# Patient Record
(demographics unavailable — no encounter records)

---

## 2025-03-31 NOTE — REVIEW OF SYSTEMS
Tico from [facility] calling to see if you received his request for the sleep  Study that pt had done.No record of receiving it in chart. Will be sending it again today.  [phone] Tico   [Negative] : Psychiatric

## 2025-04-03 NOTE — LETTER BODY
[Attached please find my note.] : Attached please find my note. [FreeTextEntry2] : Miroslava Arana MD [address] [place] [address] Tel [phone] Fax [phone]   Dear Dr Arana   Ms Ezekiel Giang was seen in my office for a post operative check up after her endometrial cancer surgery.  Please see my consult note for her plan of care.  Thank you for allowing me to participate in the care of this patient.    Please do not hesitate to call if you have any questions.    Most Sincerely,      Gilbert Aguillon MD [facility] Director, [facility] Professor of Obstetrics and Gynecology, [facility] [employer] at [facility] Division of Gynecologic Oncology Department Obstetrics and Gynecology Tel [phone] or [phone] Fax [phone] [email or web address]  [FreeTextEntry1] : pathology report

## 2025-04-03 NOTE — PAST MEDICAL HISTORY
[Postmenopausal] : The patient is postmenopausal [Menarche Age ____] : age at menarche was [unfilled] [Menopause Age____] : age at menopause was [unfilled] [Total Preg ___] : G[unfilled] [Live Births ___] : P[unfilled]  [Living ___] : Living: [unfilled] [AB Induced ___] : elective abortions: [unfilled]  [de-identified] : 49 yo

## 2025-04-03 NOTE — PHYSICAL EXAM
[Normal] : General appearance: No acute distress, well appearing and well nourished [de-identified] : incisions c/d/i  opsites removed today  she did not remove them as instructed [Fully active, able to carry on all pre-disease performance without restriction] : Status 0 - Fully active, able to carry on all pre-disease performance without restriction

## 2025-04-03 NOTE — PAST MEDICAL HISTORY
[Postmenopausal] : The patient is postmenopausal [Menarche Age ____] : age at menarche was [unfilled] [Menopause Age____] : age at menopause was [unfilled] [Total Preg ___] : G[unfilled] [Live Births ___] : P[unfilled]  [Living ___] : Living: [unfilled] [AB Induced ___] : elective abortions: [unfilled]  [de-identified] : 49 yo

## 2025-04-03 NOTE — PHYSICAL EXAM
[Normal] : General appearance: No acute distress, well appearing and well nourished [de-identified] : incisions c/d/i  opsites removed today  she did not remove them as instructed [Fully active, able to carry on all pre-disease performance without restriction] : Status 0 - Fully active, able to carry on all pre-disease performance without restriction

## 2025-04-03 NOTE — ASSESSMENT
[FreeTextEntry1] : Laparoscopic incisions are healing well. No signs or symptoms of infection, no bleeding. Reviewed the importance of high-quality dietary protein for wound healing.  Reiterated post-operative education, including: No heavy lifting greater than 5 pounds for 6-8 weeks. Increase ambulation as tolerated; no heavy or excessive exercise for 8 weeks. Continue showers only; pat incisions dry for the next 6-8 weeks. No tub baths or swimming in pool/ocean for 6-8 weeks. No intercourse for 8 weeks  Final pathology reviewed with patient today.  We will discussed her case during the tumor board and possible RT treatment discussed today. Patient is pathology showed minimal residual tremor in the endometrium only.  The rest of the specimen including nodes omentum and the other pelvic organs were negative for disease.  This puts the patient at a stage Ia grade 3.  As stated tumor board discussion will happen next week so that we can make a recommendation for the patient.  Return to the office visit in 3  weeks for 2nd post-op visit or earlier if the patient feels there is a need.  Emotional support provided. Patient advised to call between visits with any concerns. Patient verbalized understanding of the plan and agrees. All questions were answered.         none

## 2025-04-03 NOTE — HISTORY OF PRESENT ILLNESS
Patient arrived to [address]. VSS. L forearm 22 g placed. Plan for solumedrol x3 starting today 2/22/24. Patient oriented to room and call light use.    [FreeTextEntry1] : Surgery Date: 03/24/2025  Ezekiel Giang is post-operative following a robotic-assisted total laparoscopic hysterectomy, bilateral salpingoophorectomy, cystoscopy, and bilateral sentinel lymph node mapping and biopsy   Patient reported feeling well today and no complaint of fever, chills, nausea, vomiting, diarrhea, or vaginal bleeding. She took her narcotic medication the first two days after surgery.  She has not taken any pain medication since then.  GI function and  function both are back to normal  She did not remove her outside bandates    SURGICAL PATHOLOGY Final Diagnosis (03/24/2025- [facility]) 1. Uterus, cervix, bilateral ovaries and fallopian tubes; hysterectomy and salpingo-oophorectomy: - Minute residual serous carcinoma. - No myometrial invasion seen. - No lymphovascular invasion seen. - Benign fallopian tubes, ovaries and cervix. - See synoptic summary. 2. Lymph node, left external illiac sentinel; excision: - One lymph node, negative for carcinoma on H T E slide (0/1). 3. Lymph node, right external illiac sentinel; excision: - Two lymph nodes, negative for carcinoma on H T E slide (0/2). Note: [place] lymph node protocol is ordered in parts 2 and 3, and will be reported in an addendum. 4. Omentum; excision: - Benign fibroadipose tissue.  === VISIT HISTORY 03/11/2025 Ms. Giang, 61 years old, , LMP 02/2024, referred for abnormal endometrial biopsy.  Patient was seen by Dr. Arana for post-menopausal bleeding.  MRI-Pelvis (02/21/2025-[facility]-Dr. Rodríguez): Structure in the endometrial cavity measuring up to 1.2 cm with differential including polyp. EMB (02/27/2025-Dr. Patrick) showing papillary serious carcinoma. Microcalcification is present within the stroma. Endometrial polyps.  She reports bloating and lower back pain and vaginal bleeding/spotting.    === PATHOLOGY D&C/Hysteroscopy/EMB  (02/27/2025-Dr. Arana) Papillary serious carcinoma. Microcalcification is present within the stroma. Endometrial polyps   SURGICAL PATHOLOGY Final Diagnosis (03/24/2025- [facility]) 1. Uterus, cervix, bilateral ovaries and fallopian tubes; hysterectomy and salpingo-oophorectomy: - Minute residual serous carcinoma. - No myometrial invasion seen. - No lymphovascular invasion seen. - Benign fallopian tubes, ovaries and cervix. - See synoptic summary. 2. Lymph node, left external illiac sentinel; excision: - One lymph node, negative for carcinoma on H T E slide (0/1). 3. Lymph node, right external illiac sentinel; excision: - Two lymph nodes, negative for carcinoma on H T E slide (0/2). Note: [place] lymph node protocol is ordered in parts 2 and 3, and will be reported in an addendum. 4. Omentum; excision: - Benign fibroadipose tissue.  === IMAGINE  CAT scan/urogram(11/16/2024-[facility]) Impression: No urolithiasis, solid renal lesion or collecting system filling defect to explain hematuria. No acute process in the abdomen.  MRI-Pelvis (02/21/2025-[facility]) FINDINGS: -UTERUS: The uterus is retroflexed. The uterus measures 3.5 x 4,3 5.4 cm in AP, transverse, and craniocaudal dimension. There is a 2.0 cm fibroid anteriorly from the right fundus with subserosal component and mild enhancement with v contrast. -ENDOMETRIUM: The endometrial stripe measures 0.5 cm. There is a polypoid structure within the endometrial cavity measuring up to 1.2 x 0,5 cm. -JUNCTIONAL ZONE: The junctional zone is focally thickened posteriorly. The anterior junctional zone measures 24 cm. The posterior junctional zone measures focally thickened and measuring up to 1,2 cm cm. IMPRESSION: -Structure in the endometrial cavity measuring up to 1.2 cm with differential including polyp. -Focal thickening of the posterior junctional zone compatible with adenomyosis -Right fundal intramural and subserosal fibroid with enhancement -No bladder mass or abnormal enhancement. The etiology the patient's hematuria is not identified on this exam.    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

## 2025-04-03 NOTE — ASSESSMENT
[FreeTextEntry1] : Laparoscopic incisions are healing well. No signs or symptoms of infection, no bleeding. Reviewed the importance of high-quality dietary protein for wound healing.  Reiterated post-operative education, including: No heavy lifting greater than 5 pounds for 6-8 weeks. Increase ambulation as tolerated; no heavy or excessive exercise for 8 weeks. Continue showers only; pat incisions dry for the next 6-8 weeks. No tub baths or swimming in pool/ocean for 6-8 weeks. No intercourse for 8 weeks  Final pathology reviewed with patient today.  We will discussed her case during the tumor board and possible RT treatment discussed today. Patient is pathology showed minimal residual tremor in the endometrium only.  The rest of the specimen including nodes omentum and the other pelvic organs were negative for disease.  This puts the patient at a stage Ia grade 3.  As stated tumor board discussion will happen next week so that we can make a recommendation for the patient.  Return to the office visit in 3  weeks for 2nd post-op visit or earlier if the patient feels there is a need.  Emotional support provided. Patient advised to call between visits with any concerns. Patient verbalized understanding of the plan and agrees. All questions were answered.

## 2025-04-03 NOTE — HISTORY OF PRESENT ILLNESS
[FreeTextEntry1] : Surgery Date: 03/24/2025  Ezekiel Giang is post-operative following a robotic-assisted total laparoscopic hysterectomy, bilateral salpingoophorectomy, cystoscopy, and bilateral sentinel lymph node mapping and biopsy   Patient reported feeling well today and no complaint of fever, chills, nausea, vomiting, diarrhea, or vaginal bleeding. She took her narcotic medication the first two days after surgery.  She has not taken any pain medication since then.  GI function and  function both are back to normal  She did not remove her outside bandates    SURGICAL PATHOLOGY Final Diagnosis (03/24/2025- [facility]) 1. Uterus, cervix, bilateral ovaries and fallopian tubes; hysterectomy and salpingo-oophorectomy: - Minute residual serous carcinoma. - No myometrial invasion seen. - No lymphovascular invasion seen. - Benign fallopian tubes, ovaries and cervix. - See synoptic summary. 2. Lymph node, left external illiac sentinel; excision: - One lymph node, negative for carcinoma on H T E slide (0/1). 3. Lymph node, right external illiac sentinel; excision: - Two lymph nodes, negative for carcinoma on H T E slide (0/2). Note: [place] lymph node protocol is ordered in parts 2 and 3, and will be reported in an addendum. 4. Omentum; excision: - Benign fibroadipose tissue.  === VISIT HISTORY 03/11/2025 Ms. Giang, 61 years old, , LMP 02/2024, referred for abnormal endometrial biopsy.  Patient was seen by Dr. Arana for post-menopausal bleeding.  MRI-Pelvis (02/21/2025-[facility]-Dr. Rodríguez): Structure in the endometrial cavity measuring up to 1.2 cm with differential including polyp. EMB (02/27/2025-Dr. Patrick) showing papillary serious carcinoma. Microcalcification is present within the stroma. Endometrial polyps.  She reports bloating and lower back pain and vaginal bleeding/spotting.    === PATHOLOGY D&C/Hysteroscopy/EMB  (02/27/2025-Dr. Arana) Papillary serious carcinoma. Microcalcification is present within the stroma. Endometrial polyps   SURGICAL PATHOLOGY Final Diagnosis (03/24/2025- [facility]) 1. Uterus, cervix, bilateral ovaries and fallopian tubes; hysterectomy and salpingo-oophorectomy: - Minute residual serous carcinoma. - No myometrial invasion seen. - No lymphovascular invasion seen. - Benign fallopian tubes, ovaries and cervix. - See synoptic summary. 2. Lymph node, left external illiac sentinel; excision: - One lymph node, negative for carcinoma on H T E slide (0/1). 3. Lymph node, right external illiac sentinel; excision: - Two lymph nodes, negative for carcinoma on H T E slide (0/2). Note: [place] lymph node protocol is ordered in parts 2 and 3, and will be reported in an addendum. 4. Omentum; excision: - Benign fibroadipose tissue.  === IMAGINE  CAT scan/urogram(11/16/2024-[facility]) Impression: No urolithiasis, solid renal lesion or collecting system filling defect to explain hematuria. No acute process in the abdomen.  MRI-Pelvis (02/21/2025-[facility]) FINDINGS: -UTERUS: The uterus is retroflexed. The uterus measures 3.5 x 4,3 5.4 cm in AP, transverse, and craniocaudal dimension. There is a 2.0 cm fibroid anteriorly from the right fundus with subserosal component and mild enhancement with v contrast. -ENDOMETRIUM: The endometrial stripe measures 0.5 cm. There is a polypoid structure within the endometrial cavity measuring up to 1.2 x 0,5 cm. -JUNCTIONAL ZONE: The junctional zone is focally thickened posteriorly. The anterior junctional zone measures 24 cm. The posterior junctional zone measures focally thickened and measuring up to 1,2 cm cm. IMPRESSION: -Structure in the endometrial cavity measuring up to 1.2 cm with differential including polyp. -Focal thickening of the posterior junctional zone compatible with adenomyosis -Right fundal intramural and subserosal fibroid with enhancement -No bladder mass or abnormal enhancement. The etiology the patient's hematuria is not identified on this exam.    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

## 2025-04-03 NOTE — LETTER BODY
[Attached please find my note.] : Attached please find my note. [FreeTextEntry2] : Miroslava Arana MD [address] [place] [address] Tel [phone] Fax [phone]   Dear Dr Arana   Ms Ezekiel Giang was seen in my office for a post operative check up after her endometrial cancer surgery.  Please see my consult note for her plan of care.  Thank you for allowing me to participate in the care of this patient.    Please do not hesitate to call if you have any questions.    Most Sincerely,      Gilbert Aguillon MD [facility] Director, [facility] Professor of Obstetrics and Gynecology, [facility] [employer] at [facility] Division of Gynecologic Oncology Department Obstetrics and Gynecology Tel [phone] or [phone] Fax [phone] [email or web address]  [FreeTextEntry1] : pathology report

## 2025-04-17 NOTE — PHYSICAL EXAM
[Fully active, able to carry on all pre-disease performance without restriction] : Status 0 - Fully active, able to carry on all pre-disease performance without restriction [Chaperoned Physical Exam] : A chaperone was present in the examining room during all aspects of the physical examination. [MA] : MA [Absent] : Adnexa(ae): Absent [Normal] : Recto-Vaginal Exam: Normal [de-identified] : Well healing vaginal cuff. No s/s infection or bleeding. [FreeTextEntry2] : Geeta [de-identified] : The laparoscopic incision site shows no signs or symptoms of infection. [de-identified] : vaginal cuff healing well

## 2025-04-17 NOTE — LETTER BODY
[Attached please find my note.] : Attached please find my note. [FreeTextEntry2] : Miroslava Araan MD [address] [place] [address] Tel [phone] Fax [phone]   Dear Dr Arana   Ms Ezekiel Giang was seen in my office for a follow up post operative check up and also a management discussion after tumor board recommendation was made.  Please see my consult note for her plan of care.  Thank you for allowing me to participate in the care of this patient.    Please do not hesitate to call if you have any questions.    Most Sincerely,      Gilbert Aguillon MD [facility] Director, [facility] Professor of Obstetrics and Gynecology, [facility] [employer] at [facility] Division of Gynecologic Oncology Department Obstetrics and Gynecology Tel [phone] or [phone] Fax [phone] [email or web address]

## 2025-04-17 NOTE — ASSESSMENT
[FreeTextEntry1] : Laparoscopic incisions are healing well. Well healing vaginal cuff. No s/s infection or bleeding. Reviewed the importance of high-quality dietary protein for wound healing. Reiterated post-operative education, including: No heavy lifting greater than 5 pounds for 6-8 weeks. Increase ambulation as tolerated; no heavy or excessive exercise for 8 weeks. Continue showers only; pat incisions dry for the next 6-8 weeks. No tub baths or swimming in pool/ocean for 6-8 weeks. No intercourse for 8 weeks  Final pathology reviewed with patient again with the recommendation of the tumor board discussed TUMOR BOARD (04/09/2025- [facility])   Diagnosis: Stage IC nonmyoinvasive uterine serous carcinoma  (2009 [place])  Recommendation: VBT + systemic therapy NCCN Guidelines discussed: yes  Radiation therapy and chemotherapy was discussed with the patient today.  Discussed the rationale for systemic chemotherapy along with brachytherapy. Discussed use of paclitaxel/carboplatin. Discussed the use of every 3-week regimen.   Discussed the number of cycles that will be planned for the patient.   Discussed toxicity with patient both the reversible ones and the irreversible one.   Discussed the need for prechemo examinations and prechemotherapy blood test.   Discussed free chemotherapy medication to minimize risk of hypersensitive reaction and nausea. Discussed the objective of the chemotherapy treatment.   Discussed follow-up of brachytherapy after completion of chemotherapy.   Answered all questions.   Patient signed consent form for the chemotherapy treatment.   Blood tests today Will try to set up for her to get started on Friday/25/25.   She will also have a consultation appointment with Dr. Mejia of radiation at [facility]. I will contact Dr. Guadalupe's team to arrange her initial consultation.  Patient advised to call between visits with any concerns. Patient verbalized understanding of the plan and agrees. All questions were answered.

## 2025-04-17 NOTE — HISTORY OF PRESENT ILLNESS
[FreeTextEntry1] : Patient is here for 2nd post-operative visit checkup and discuss further treatment options.  Ezekiel Giang is post-operative following a robotic-assisted total laparoscopic hysterectomy, bilateral salpingoophorectomy, cystoscopy, and bilateral sentinel lymph node mapping and biopsy.  Surgery Date: 03/24/2025.  Patient was discharged on the same day.  TUMOR BOARD (04/09/2025- [facility])   Diagnosis: Stage IC nonmyoinvasive uterine serous carcinoma  (2009 [place])  Recommendation: VBT + systemic therapy NCCN Guidelines discussed: yes  Patient was feeling well today.   She has not needed any pain medication.  She has resumed regular activities. She reported no fever, chills, nausea, vomiting, or vaginal bleeding. There was not any changes in bowel or bladder habits. Regular BM and voiding freely. There were no changes in appetite or unintentional weight loss or gain.   === VISIT HISTORY 04/03/2025 Patient reported feeling well today and no complaint of fever, chills, nausea, vomiting, diarrhea, or vaginal bleeding. She took her narcotic medication the first two days after surgery.  She has not taken any pain medication since then.  GI function and  function both are back to normal  She did not remove her outside bandages.  03/11/2025 Ms. Giang, 61 years old, , LMP 02/2024, referred for abnormal endometrial biopsy.  Patient was seen by Dr. Arana for post-menopausal bleeding.  MRI-Pelvis (02/21/2025-[facility]-Dr. Rodríguez): Structure in the endometrial cavity measuring up to 1.2 cm with differential including polyp. EMB (02/27/2025-Dr. Patrick) showing papillary serious carcinoma. Microcalcification is present within the stroma. Endometrial polyps.  She reports bloating and lower back pain and vaginal bleeding/spotting.    === PATHOLOGY D&C/Hysteroscopy/EMB  (02/27/2025-Dr. Arana) Papillary serious carcinoma. Microcalcification is present within the stroma. Endometrial polyps   SURGICAL PATHOLOGY Final Diagnosis (03/24/2025- [facility]) 1. Uterus, cervix, bilateral ovaries and fallopian tubes; hysterectomy and salpingo-oophorectomy: - Minute residual serous carcinoma. - No myometrial invasion seen. - No lymphovascular invasion seen. - Benign fallopian tubes, ovaries and cervix. - See synoptic summary. 2. Lymph node, left external illiac sentinel; excision: - One lymph node, negative for carcinoma on H T E slide (0/1). 3. Lymph node, right external illiac sentinel; excision: - Two lymph nodes, negative for carcinoma on H T E slide (0/2). Note: [place] lymph node protocol is ordered in parts 2 and 3, and will be reported in an addendum. 4. Omentum; excision: - Benign fibroadipose tissue.  === IMAGINE  CAT scan/urogram(11/16/2024-[facility]) Impression: No urolithiasis, solid renal lesion or collecting system filling defect to explain hematuria. No acute process in the abdomen.  MRI-Pelvis (02/21/2025-[facility]) FINDINGS: -UTERUS: The uterus is retroflexed. The uterus measures 3.5 x 4,3 5.4 cm in AP, transverse, and craniocaudal dimension. There is a 2.0 cm fibroid anteriorly from the right fundus with subserosal component and mild enhancement with v contrast. -ENDOMETRIUM: The endometrial stripe measures 0.5 cm. There is a polypoid structure within the endometrial cavity measuring up to 1.2 x 0,5 cm. -JUNCTIONAL ZONE: The junctional zone is focally thickened posteriorly. The anterior junctional zone measures 24 cm. The posterior junctional zone measures focally thickened and measuring up to 1,2 cm cm. IMPRESSION: -Structure in the endometrial cavity measuring up to 1.2 cm with differential including polyp. -Focal thickening of the posterior junctional zone compatible with adenomyosis -Right fundal intramural and subserosal fibroid with enhancement -No bladder mass or abnormal enhancement. The etiology the patient's hematuria is not identified on this exam.    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

## 2025-04-17 NOTE — REASON FOR VISIT
[Spouse] : spouse [FreeTextEntry1] : Patient is here for 2nd post-operative visit checkup and discussion regarding tumor board recommendation

## 2025-04-17 NOTE — PAST MEDICAL HISTORY
[Postmenopausal] : The patient is postmenopausal [Menarche Age ____] : age at menarche was [unfilled] [Menopause Age____] : age at menopause was [unfilled] [Total Preg ___] : G[unfilled] [Live Births ___] : P[unfilled]  [Living ___] : Living: [unfilled] [AB Induced ___] : elective abortions: [unfilled]  [de-identified] : 49 yo

## 2025-04-17 NOTE — ASSESSMENT
[FreeTextEntry1] : Laparoscopic incisions are healing well. Well healing vaginal cuff. No s/s infection or bleeding. Reviewed the importance of high-quality dietary protein for wound healing. Reiterated post-operative education, including: No heavy lifting greater than 5 pounds for 6-8 weeks. Increase ambulation as tolerated; no heavy or excessive exercise for 8 weeks. Continue showers only; pat incisions dry for the next 6-8 weeks. No tub baths or swimming in pool/ocean for 6-8 weeks. No intercourse for 8 weeks  Final pathology reviewed with patient again with the recommendation of the tumor board discussed TUMOR BOARD (04/09/2025- [facility])   Diagnosis: Stage IC nonmyoinvasive uterine serous carcinoma  (2009 [place])  Recommendation: VBT + systemic therapy NCCN Guidelines discussed: yes  Radiation therapy and chemotherapy was discussed with the patient today.  Discussed the rationale for systemic chemotherapy along with brachytherapy. Discussed use of paclitaxel/carboplatin. Discussed the use of every 3-week regimen.   Discussed the number of cycles that will be planned for the patient.   Discussed toxicity with patient both the reversible ones and the irreversible one.   Discussed the need for prechemo examinations and prechemotherapy blood test.   Discussed free chemotherapy medication to minimize risk of hypersensitive reaction and nausea. Discussed the objective of the chemotherapy treatment.   Discussed follow-up of brachytherapy after completion of chemotherapy.   Answered all questions.   Patient signed consent form for the chemotherapy treatment.   Blood tests today Will try to set up for her to get started on Friday/25/25.   She will also have a consultation appointment with Dr. Mejia of radiation at [facility]. I will contact Dr. Guadalupe's team to arrange her initial consultation.  Patient advised to call between visits with any concerns. Patient verbalized understanding of the plan and agrees. All questions were answered.

## 2025-04-17 NOTE — LETTER BODY
[Attached please find my note.] : Attached please find my note. [FreeTextEntry2] : Miroslava Arana MD [address] [place] [address] Tel [phone] Fax [phone]   Dear Dr Arana   Ms Ezekiel Giang was seen in my office for a follow up post operative check up and also a management discussion after tumor board recommendation was made.  Please see my consult note for her plan of care.  Thank you for allowing me to participate in the care of this patient.    Please do not hesitate to call if you have any questions.    Most Sincerely,      Gilbert Aguillon MD [facility] Director, [facility] Professor of Obstetrics and Gynecology, [employer] at [facility] Division of Gynecologic Oncology Department Obstetrics and Gynecology Tel [phone] or [phone] Fax [phone] [email or web address]

## 2025-04-17 NOTE — ASSESSMENT
[FreeTextEntry1] : Laparoscopic incisions are healing well. Well healing vaginal cuff. No s/s infection or bleeding. Reviewed the importance of high-quality dietary protein for wound healing. Reiterated post-operative education, including: No heavy lifting greater than 5 pounds for 6-8 weeks. Increase ambulation as tolerated; no heavy or excessive exercise for 8 weeks. Continue showers only; pat incisions dry for the next 6-8 weeks. No tub baths or swimming in pool/ocean for 6-8 weeks. No intercourse for 8 weeks  Final pathology reviewed with patient again with the recommendation of the tumor board discussed TUMOR BOARD (04/09/2025- [facility])   Diagnosis: Stage IC nonmyoinvasive uterine serous carcinoma  (2009 [place])  Recommendation: VBT + systemic therapy NCCN Guidelines discussed: yes  Radiation therapy and chemotherapy was discussed with the patient today.  Discussed the rationale for systemic chemotherapy along with brachytherapy. Discussed use of paclitaxel/carboplatin. Discussed the use of every 3-week regimen.   Discussed the number of cycles that will be planned for the patient.   Discussed toxicity with patient both the reversible ones and the irreversible one.   Discussed the need for prechemo examinations and prechemotherapy blood test.   Discussed free chemotherapy medication to minimize risk of hypersensitive reaction and nausea. Discussed the objective of the chemotherapy treatment.   Discussed follow-up of brachytherapy after completion of chemotherapy.   Answered all questions.   Patient signed consent form for the chemotherapy treatment.   Blood tests today Will try to set up for her to get started on Friday/25/25.   She will also have a consultation appointment with Dr. Mejia of radiation at [facility]. I will contact Dr. Guadalupe's team to arrange her initial consultation.  Patient advised to call between visits with any concerns. Patient verbalized understanding of the plan and agrees. All questions were answered.

## 2025-04-17 NOTE — LETTER BODY
[Attached please find my note.] : Attached please find my note. [FreeTextEntry2] : Miroslava Arana MD [address] [place] [address] Tel [phone] Fax [phone]   Dear Dr Arana   Ms Ezekiel Giang was seen in my office for a follow up post operative check up and also a management discussion after tumor board recommendation was made.  Please see my consult note for her plan of care.  Thank you for allowing me to participate in the care of this patient.    Please do not hesitate to call if you have any questions.    Most Sincerely,      Gilbert Aguillon MD [facility] Director, [facility] Professor of Obstetrics and Gynecology, [facility] [employer] at [facility] Division of Gynecologic Oncology Department Obstetrics and Gynecology Tel [phone] or [phone] Fax [phone] [email or web address]

## 2025-04-17 NOTE — PHYSICAL EXAM
[Fully active, able to carry on all pre-disease performance without restriction] : Status 0 - Fully active, able to carry on all pre-disease performance without restriction [Chaperoned Physical Exam] : A chaperone was present in the examining room during all aspects of the physical examination. [MA] : MA [Absent] : Adnexa(ae): Absent [Normal] : Recto-Vaginal Exam: Normal [de-identified] : Well healing vaginal cuff. No s/s infection or bleeding. [FreeTextEntry2] : Geeta [de-identified] : The laparoscopic incision site shows no signs or symptoms of infection. [de-identified] : vaginal cuff healing well

## 2025-04-17 NOTE — PAST MEDICAL HISTORY
[Postmenopausal] : The patient is postmenopausal [Menarche Age ____] : age at menarche was [unfilled] [Menopause Age____] : age at menopause was [unfilled] [Total Preg ___] : G[unfilled] [Live Births ___] : P[unfilled]  [Living ___] : Living: [unfilled] [AB Induced ___] : elective abortions: [unfilled]  [de-identified] : 51 yo

## 2025-04-17 NOTE — PAST MEDICAL HISTORY
[Postmenopausal] : The patient is postmenopausal [Menarche Age ____] : age at menarche was [unfilled] [Menopause Age____] : age at menopause was [unfilled] [Total Preg ___] : G[unfilled] [Live Births ___] : P[unfilled]  [Living ___] : Living: [unfilled] [AB Induced ___] : elective abortions: [unfilled]  [de-identified] : 51 yo

## 2025-04-17 NOTE — HISTORY OF PRESENT ILLNESS
[FreeTextEntry1] : Patient is here for 2nd post-operative visit checkup and discuss further treatment options.  Ezekiel Giang is post-operative following a robotic-assisted total laparoscopic hysterectomy, bilateral salpingoophorectomy, cystoscopy, and bilateral sentinel lymph node mapping and biopsy.  Surgery Date: 03/24/2025.  Patient was discharged on the same day.  TUMOR BOARD (04/09/2025- [facility])   Diagnosis: Stage IC nonmyoinvasive uterine serous carcinoma  (2009 IA)  Recommendation: VBT + systemic therapy NCCN Guidelines discussed: yes  Patient was feeling well today.   She has not needed any pain medication.  She has resumed regular activities. She reported no fever, chills, nausea, vomiting, or vaginal bleeding. There was not any changes in bowel or bladder habits. Regular BM and voiding freely. There were no changes in appetite or unintentional weight loss or gain.   === VISIT HISTORY 04/03/2025 Patient reported feeling well today and no complaint of fever, chills, nausea, vomiting, diarrhea, or vaginal bleeding. She took her narcotic medication the first two days after surgery.  She has not taken any pain medication since then.  GI function and  function both are back to normal  She did not remove her outside bandages.  03/11/2025 Ms. Giang, 61 years old, , LMP 02/2024, referred for abnormal endometrial biopsy.  Patient was seen by Dr. Arana for post-menopausal bleeding.  MRI-Pelvis (02/21/2025-[facility]-Dr. Rodríguez): Structure in the endometrial cavity measuring up to 1.2 cm with differential including polyp. EMB (02/27/2025-Dr. Patrick) showing papillary serious carcinoma. Microcalcification is present within the stroma. Endometrial polyps.  She reports bloating and lower back pain and vaginal bleeding/spotting.    === PATHOLOGY D&C/Hysteroscopy/EMB  (02/27/2025-Dr. Arana) Papillary serious carcinoma. Microcalcification is present within the stroma. Endometrial polyps   SURGICAL PATHOLOGY Final Diagnosis (03/24/2025- Yumiko) 1. Uterus, cervix, bilateral ovaries and fallopian tubes; hysterectomy and salpingo-oophorectomy: - Minute residual serous carcinoma. - No myometrial invasion seen. - No lymphovascular invasion seen. - Benign fallopian tubes, ovaries and cervix. - See synoptic summary. 2. Lymph node, left external illiac sentinel; excision: - One lymph node, negative for carcinoma on H T E slide (0/1). 3. Lymph node, right external illiac sentinel; excision: - Two lymph nodes, negative for carcinoma on H T E slide (0/2). Note: [place] lymph node protocol is ordered in parts 2 and 3, and will be reported in an addendum. 4. Omentum; excision: - Benign fibroadipose tissue.  === IMAGINE  CAT scan/urogram(11/16/2024-[facility]) Impression: No urolithiasis, solid renal lesion or collecting system filling defect to explain hematuria. No acute process in the abdomen.  MRI-Pelvis (02/21/2025-[facility]) FINDINGS: -UTERUS: The uterus is retroflexed. The uterus measures 3.5 x 4,3 5.4 cm in AP, transverse, and craniocaudal dimension. There is a 2.0 cm fibroid anteriorly from the right fundus with subserosal component and mild enhancement with v contrast. -ENDOMETRIUM: The endometrial stripe measures 0.5 cm. There is a polypoid structure within the endometrial cavity measuring up to 1.2 x 0,5 cm. -JUNCTIONAL ZONE: The junctional zone is focally thickened posteriorly. The anterior junctional zone measures 24 cm. The posterior junctional zone measures focally thickened and measuring up to 1,2 cm cm. IMPRESSION: -Structure in the endometrial cavity measuring up to 1.2 cm with differential including polyp. -Focal thickening of the posterior junctional zone compatible with adenomyosis -Right fundal intramural and subserosal fibroid with enhancement -No bladder mass or abnormal enhancement. The etiology the patient's hematuria is not identified on this exam.    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

## 2025-04-17 NOTE — HISTORY OF PRESENT ILLNESS
[FreeTextEntry1] : Patient is here for 2nd post-operative visit checkup and discuss further treatment options.  Ezekiel Giang is post-operative following a robotic-assisted total laparoscopic hysterectomy, bilateral salpingoophorectomy, cystoscopy, and bilateral sentinel lymph node mapping and biopsy.  Surgery Date: 03/24/2025.  Patient was discharged on the same day.  TUMOR BOARD (04/09/2025- [facility])   Diagnosis: Stage IC nonmyoinvasive uterine serous carcinoma  (2009 IA)  Recommendation: VBT + systemic therapy NCCN Guidelines discussed: yes  Patient was feeling well today.   She has not needed any pain medication.  She has resumed regular activities. She reported no fever, chills, nausea, vomiting, or vaginal bleeding. There was not any changes in bowel or bladder habits. Regular BM and voiding freely. There were no changes in appetite or unintentional weight loss or gain.   === VISIT HISTORY 04/03/2025 Patient reported feeling well today and no complaint of fever, chills, nausea, vomiting, diarrhea, or vaginal bleeding. She took her narcotic medication the first two days after surgery.  She has not taken any pain medication since then.  GI function and  function both are back to normal  She did not remove her outside bandages.  03/11/2025 Ms. Giang, 61 years old, , LMP 02/2024, referred for abnormal endometrial biopsy.  Patient was seen by Dr. Arana for post-menopausal bleeding.  MRI-Pelvis (02/21/2025-[facility]-Dr. Rodríguez): Structure in the endometrial cavity measuring up to 1.2 cm with differential including polyp. EMB (02/27/2025-Dr. Patrick) showing papillary serious carcinoma. Microcalcification is present within the stroma. Endometrial polyps.  She reports bloating and lower back pain and vaginal bleeding/spotting.    === PATHOLOGY D&C/Hysteroscopy/EMB  (02/27/2025-Dr. Arana) Papillary serious carcinoma. Microcalcification is present within the stroma. Endometrial polyps   SURGICAL PATHOLOGY Final Diagnosis (03/24/2025- [facility]) 1. Uterus, cervix, bilateral ovaries and fallopian tubes; hysterectomy and salpingo-oophorectomy: - Minute residual serous carcinoma. - No myometrial invasion seen. - No lymphovascular invasion seen. - Benign fallopian tubes, ovaries and cervix. - See synoptic summary. 2. Lymph node, left external illiac sentinel; excision: - One lymph node, negative for carcinoma on H T E slide (0/1). 3. Lymph node, right external illiac sentinel; excision: - Two lymph nodes, negative for carcinoma on H T E slide (0/2). Note: [place] lymph node protocol is ordered in parts 2 and 3, and will be reported in an addendum. 4. Omentum; excision: - Benign fibroadipose tissue.  === IMAGINE  CAT scan/urogram(11/16/2024-[facility]) Impression: No urolithiasis, solid renal lesion or collecting system filling defect to explain hematuria. No acute process in the abdomen.  MRI-Pelvis (02/21/2025-[facility]) FINDINGS: -UTERUS: The uterus is retroflexed. The uterus measures 3.5 x 4,3 5.4 cm in AP, transverse, and craniocaudal dimension. There is a 2.0 cm fibroid anteriorly from the right fundus with subserosal component and mild enhancement with v contrast. -ENDOMETRIUM: The endometrial stripe measures 0.5 cm. There is a polypoid structure within the endometrial cavity measuring up to 1.2 x 0,5 cm. -JUNCTIONAL ZONE: The junctional zone is focally thickened posteriorly. The anterior junctional zone measures 24 cm. The posterior junctional zone measures focally thickened and measuring up to 1,2 cm cm. IMPRESSION: -Structure in the endometrial cavity measuring up to 1.2 cm with differential including polyp. -Focal thickening of the posterior junctional zone compatible with adenomyosis -Right fundal intramural and subserosal fibroid with enhancement -No bladder mass or abnormal enhancement. The etiology the patient's hematuria is not identified on this exam.    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

## 2025-04-17 NOTE — PHYSICAL EXAM
[Fully active, able to carry on all pre-disease performance without restriction] : Status 0 - Fully active, able to carry on all pre-disease performance without restriction [Chaperoned Physical Exam] : A chaperone was present in the examining room during all aspects of the physical examination. [MA] : MA [Absent] : Adnexa(ae): Absent [Normal] : Recto-Vaginal Exam: Normal [de-identified] : Well healing vaginal cuff. No s/s infection or bleeding. [FreeTextEntry2] : Geeta [de-identified] : The laparoscopic incision site shows no signs or symptoms of infection. [de-identified] : vaginal cuff healing well

## 2025-05-13 NOTE — PAST MEDICAL HISTORY
[Postmenopausal] : The patient is postmenopausal [Menarche Age ____] : age at menarche was [unfilled] [Menopause Age____] : age at menopause was [unfilled] [Total Preg ___] : G[unfilled] [Live Births ___] : P[unfilled]  [Living ___] : Living: [unfilled] [AB Induced ___] : elective abortions: [unfilled]  [de-identified] : 51 yo

## 2025-05-13 NOTE — PAST MEDICAL HISTORY
[Postmenopausal] : The patient is postmenopausal [Menarche Age ____] : age at menarche was [unfilled] [Menopause Age____] : age at menopause was [unfilled] [Total Preg ___] : G[unfilled] [Live Births ___] : P[unfilled]  [Living ___] : Living: [unfilled] [AB Induced ___] : elective abortions: [unfilled]  [de-identified] : 51 yo

## 2025-05-13 NOTE — PHYSICAL EXAM
[Chaperoned Physical Exam] : A chaperone was present in the examining room during all aspects of the physical examination. [MA] : MA [Absent] : Adnexa(ae): Absent [Fully active, able to carry on all pre-disease performance without restriction] : Status 0 - Fully active, able to carry on all pre-disease performance without restriction [FreeTextEntry2] : Geeta [Normal] : Recto-Vaginal Exam: Normal [de-identified] : The laparoscopic incision site shows no signs or symptoms of infection. [de-identified] : vaginal cuff healing well  a small debri noted at the cuff that will probably slough off eventually  no mass palpated not tender no guarding

## 2025-05-13 NOTE — ASSESSMENT
[FreeTextEntry1] : The patient presents today for a pre-chemotherapy evaluation in preparation for Cycle 2 of Carboplatin and Paclitaxel (Carbo + Taxol), scheduled for 05/15/2025. The patient is clinically stable   Pre-chemotherapy blood work has been sent.  Will check lab before approval of Cycle 2 chemotherapy   She was advised to take prescription dexamethasone as instructed. The patient was counseled on the importance of maintaining a healthy lifestyle. Recommendations include increasing physical activity as tolerated and incorporating more high-calorie, high-protein foods into the diet.  We will schedule her a initial VBT consultation with Dr. Mejia (Radiation Oncology) at [facility] after cycle 3 of chemotherapy  The patient will return for an office visit in approximately 3 weeks, or sooner if symptoms arise.  She has been instructed to call the office between visits with any concerns.  All questions were addressed during the visit.

## 2025-05-13 NOTE — ASSESSMENT
[FreeTextEntry1] : The patient presents today for a pre-chemotherapy evaluation in preparation for Cycle 2 of Carboplatin and Paclitaxel (Carbo + Taxol), scheduled for 05/15/2025. The patient is clinically stable   Pre-chemotherapy blood work has been sent.  Will check lab before approval of Cycle 2 chemotherapy   She was advised to take prescription dexamethasone as instructed. The patient was counseled on the importance of maintaining a healthy lifestyle. Recommendations include increasing physical activity as tolerated and incorporating more high-calorie, high-protein foods into the diet.  We will schedule her a initial VBT consultation with Dr. Mejia (Radiation Oncology) at [facility] after cycle 3 of chemotherapy  The patient will return for an office visit in approximately 3 weeks, or sooner if symptoms arise.  She has been instructed to call the office between visits with any concerns.  All questions were addressed during the visit.

## 2025-05-13 NOTE — HISTORY OF PRESENT ILLNESS
[FreeTextEntry1] : The patient is presenting today for a pre-chemotherapy evaluation for Cycle 2 of Carboplatin and Paclitaxel, scheduled for 05/15/2025.  Patient feels well today. She forgot to take her steroid medication the night before her treatment.  she will remember to do this this time Patient reported no fever, chills, nausea, vomiting, or vaginal bleeding. There were not any changes in bowel or bladder habits. Regular BM and voiding freely. She has lost her hair and opted to shave the rest of it. 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  Advise her to wear a mask at all time. She said she still has energy to work.  On the other had she felt she had no choice given that it is  family business and it has not been easy to find good hire.  -CBC/CMP/Mg/Phos/ today again reminded her to take her dexamethasone on 5/14 for her treatment on 5/15.  === VISIT HISTORY 04/03/2025 Patient reported feeling well today and no complaint of fever, chills, nausea, vomiting, diarrhea, or vaginal bleeding. She took her narcotic medication the first two days after surgery.  She has not taken any pain medication since then.  GI function and  function both are back to normal  She did not remove her outside bandages.  03/11/2025 Ms. Giang, 61 years old, , LMP 02/2024, referred for abnormal endometrial biopsy.  Patient was seen by Dr. Arana for post-menopausal bleeding.  MRI-Pelvis (02/21/2025-[facility]-Dr. Rodríguez): Structure in the endometrial cavity measuring up to 1.2 cm with differential including polyp. EMB (02/27/2025-Dr. Patrick) showing papillary serious carcinoma. Microcalcification is present within the stroma. Endometrial polyps.  She reports bloating and lower back pain and vaginal bleeding/spotting.    === PATHOLOGY D&C/Hysteroscopy/EMB  (02/27/2025-Dr. Arana) Papillary serious carcinoma. Microcalcification is present within the stroma. Endometrial polyps   SURGICAL PATHOLOGY Final Diagnosis (03/24/2025- [facility]) 1. Uterus, cervix, bilateral ovaries and fallopian tubes; hysterectomy and salpingo-oophorectomy: - Minute residual serous carcinoma. - No myometrial invasion seen. - No lymphovascular invasion seen. - Benign fallopian tubes, ovaries and cervix. - See synoptic summary. 2. Lymph node, left external illiac sentinel; excision: - One lymph node, negative for carcinoma on H T E slide (0/1). 3. Lymph node, right external illiac sentinel; excision: - Two lymph nodes, negative for carcinoma on H T E slide (0/2). Note: [place] lymph node protocol is ordered in parts 2 and 3, and will be reported in an addendum. 4. Omentum; excision: - Benign fibroadipose tissue.  === IMAGINE  CAT scan/urogram(11/16/2024-[facility]) Impression: No urolithiasis, solid renal lesion or collecting system filling defect to explain hematuria. No acute process in the abdomen.  MRI-Pelvis (02/21/2025-[facility]) FINDINGS: -UTERUS: The uterus is retroflexed. The uterus measures 3.5 x 4,3 5.4 cm in AP, transverse, and craniocaudal dimension. There is a 2.0 cm fibroid anteriorly from the right fundus with subserosal component and mild enhancement with v contrast. -ENDOMETRIUM: The endometrial stripe measures 0.5 cm. There is a polypoid structure within the endometrial cavity measuring up to 1.2 x 0,5 cm. -JUNCTIONAL ZONE: The junctional zone is focally thickened posteriorly. The anterior junctional zone measures 24 cm. The posterior junctional zone measures focally thickened and measuring up to 1,2 cm cm. IMPRESSION: -Structure in the endometrial cavity measuring up to 1.2 cm with differential including polyp. -Focal thickening of the posterior junctional zone compatible with adenomyosis -Right fundal intramural and subserosal fibroid with enhancement -No bladder mass or abnormal enhancement. The etiology the patient's hematuria is not identified on this exam.    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 03/24/2025 SURGERY- Robot assisted total laparoscopic hysterectomy, bilateral salpingoophorectomy, cystoscopy, and bilateral sentinel lymph node mapping and biopsy cystoscopy [id number] 2009 and Stage IC in 2023  TUMOR BOARD (04/09/2025- [facility])   Diagnosis: Stage IC nonmyoinvasive uterine serous carcinoma  (2009 [place])  Recommendation: VBT + systemic therapy NCCN Guidelines discussed: yes  CHEMOTHERAPY 04/24/2025 C1D1 Taxol 175mg/m2 Carboplatin AUC 5 05/15/2025 C2D1 Taxol 175mg/m2 Carboplatin AUC 5 06/05/2025 C3D1  06/26/2025 C4D1  07/17/2025 C5D1  08/07/2025 C6D1   VAGINAL BRACHYTHERAPY after systemic therapy  HEALTH MAINTENANCE Mammogram (01/2025) Negative  Colonoscopy: Will have fu visit ( it was done in 01/2025)   Endoscopy: Negative (01/2025) PAP: Unsure?

## 2025-05-13 NOTE — REASON FOR VISIT
[Spouse] : spouse [FreeTextEntry1] : The patient is here for a pre-chemotherapy evaluation for cycle 2 of Carbo+Taxol

## 2025-05-13 NOTE — ASSESSMENT
[FreeTextEntry1] : The patient presents today for a pre-chemotherapy evaluation in preparation for Cycle 2 of Carboplatin and Paclitaxel (Carbo + Taxol), scheduled for 05/15/2025. The patient is clinically stable   Pre-chemotherapy blood work has been sent.  Will check lab before approval of Cycle 2 chemotherapy   She was advised to take prescription dexamethasone as instructed. The patient was counseled on the importance of maintaining a healthy lifestyle. Recommendations include increasing physical activity as tolerated and incorporating more high-calorie, high-protein foods into the diet.  We will schedule her a initial VBT consultation with Dr. Mejia (Radiation Oncology) at [facility] after cycle 3 of chemotherapy  The patient will return for an office visit in approximately 3 weeks, or sooner if symptoms arise.  She has been instructed to call the office between visits with any concerns.  All questions were addressed during the visit.

## 2025-05-13 NOTE — HISTORY OF PRESENT ILLNESS
[FreeTextEntry1] : The patient is presenting today for a pre-chemotherapy evaluation for Cycle 2 of Carboplatin and Paclitaxel, scheduled for 05/15/2025.  Patient feels well today. She forgot to take her steroid medication the night before her treatment.  she will remember to do this this time Patient reported no fever, chills, nausea, vomiting, or vaginal bleeding. There were not any changes in bowel or bladder habits. Regular BM and voiding freely. She has lost her hair and opted to shave the rest of it. 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  Advise her to wear a mask at all time. She said she still has energy to work.  On the other had she felt she had no choice given that it is  family business and it has not been easy to find good hire.  -CBC/CMP/Mg/Phos/ today again reminded her to take her dexamethasone on 5/14 for her treatment on 5/15.  === VISIT HISTORY 04/03/2025 Patient reported feeling well today and no complaint of fever, chills, nausea, vomiting, diarrhea, or vaginal bleeding. She took her narcotic medication the first two days after surgery.  She has not taken any pain medication since then.  GI function and  function both are back to normal  She did not remove her outside bandages.  03/11/2025 Ms. Giang, 61 years old, , LMP 02/2024, referred for abnormal endometrial biopsy.  Patient was seen by Dr. Arana for post-menopausal bleeding.  MRI-Pelvis (02/21/2025-[facility]-Dr. Rodríguez): Structure in the endometrial cavity measuring up to 1.2 cm with differential including polyp. EMB (02/27/2025-Dr. Patrick) showing papillary serious carcinoma. Microcalcification is present within the stroma. Endometrial polyps.  She reports bloating and lower back pain and vaginal bleeding/spotting.    === PATHOLOGY D&C/Hysteroscopy/EMB  (02/27/2025-Dr. Aarna) Papillary serious carcinoma. Microcalcification is present within the stroma. Endometrial polyps   SURGICAL PATHOLOGY Final Diagnosis (03/24/2025- Yumiko) 1. Uterus, cervix, bilateral ovaries and fallopian tubes; hysterectomy and salpingo-oophorectomy: - Minute residual serous carcinoma. - No myometrial invasion seen. - No lymphovascular invasion seen. - Benign fallopian tubes, ovaries and cervix. - See synoptic summary. 2. Lymph node, left external illiac sentinel; excision: - One lymph node, negative for carcinoma on H T E slide (0/1). 3. Lymph node, right external illiac sentinel; excision: - Two lymph nodes, negative for carcinoma on H T E slide (0/2). Note: [place] lymph node protocol is ordered in parts 2 and 3, and will be reported in an addendum. 4. Omentum; excision: - Benign fibroadipose tissue.  === IMAGINE  CAT scan/urogram(11/16/2024-[facility]) Impression: No urolithiasis, solid renal lesion or collecting system filling defect to explain hematuria. No acute process in the abdomen.  MRI-Pelvis (02/21/2025-[facility]) FINDINGS: -UTERUS: The uterus is retroflexed. The uterus measures 3.5 x 4,3 5.4 cm in AP, transverse, and craniocaudal dimension. There is a 2.0 cm fibroid anteriorly from the right fundus with subserosal component and mild enhancement with v contrast. -ENDOMETRIUM: The endometrial stripe measures 0.5 cm. There is a polypoid structure within the endometrial cavity measuring up to 1.2 x 0,5 cm. -JUNCTIONAL ZONE: The junctional zone is focally thickened posteriorly. The anterior junctional zone measures 24 cm. The posterior junctional zone measures focally thickened and measuring up to 1,2 cm cm. IMPRESSION: -Structure in the endometrial cavity measuring up to 1.2 cm with differential including polyp. -Focal thickening of the posterior junctional zone compatible with adenomyosis -Right fundal intramural and subserosal fibroid with enhancement -No bladder mass or abnormal enhancement. The etiology the patient's hematuria is not identified on this exam.    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 03/24/2025 SURGERY- Robot assisted total laparoscopic hysterectomy, bilateral salpingoophorectomy, cystoscopy, and bilateral sentinel lymph node mapping and biopsy cystoscopy STageIAG3 2009 and Stage IC in 2023  TUMOR BOARD (04/09/2025- Yumiko)   Diagnosis: Stage IC nonmyoinvasive uterine serous carcinoma  (2009 [place])  Recommendation: VBT + systemic therapy NCCN Guidelines discussed: yes  CHEMOTHERAPY 04/24/2025 C1D1 Taxol 175mg/m2 Carboplatin AUC 5 05/15/2025 C2D1 Taxol 175mg/m2 Carboplatin AUC 5 06/05/2025 C3D1  06/26/2025 C4D1  07/17/2025 C5D1  08/07/2025 C6D1   VAGINAL BRACHYTHERAPY after systemic therapy  HEALTH MAINTENANCE Mammogram (01/2025) Negative  Colonoscopy: Will have fu visit ( it was done in 01/2025)   Endoscopy: Negative (01/2025) PAP: Unsure?

## 2025-05-13 NOTE — PHYSICAL EXAM
[Chaperoned Physical Exam] : A chaperone was present in the examining room during all aspects of the physical examination. [MA] : MA [Absent] : Adnexa(ae): Absent [Fully active, able to carry on all pre-disease performance without restriction] : Status 0 - Fully active, able to carry on all pre-disease performance without restriction [FreeTextEntry2] : Geeta [Normal] : Recto-Vaginal Exam: Normal [de-identified] : The laparoscopic incision site shows no signs or symptoms of infection. [de-identified] : vaginal cuff healing well  a small debri noted at the cuff that will probably slough off eventually  no mass palpated not tender no guarding

## 2025-05-13 NOTE — PHYSICAL EXAM
[Chaperoned Physical Exam] : A chaperone was present in the examining room during all aspects of the physical examination. [MA] : MA [Absent] : Adnexa(ae): Absent [Fully active, able to carry on all pre-disease performance without restriction] : Status 0 - Fully active, able to carry on all pre-disease performance without restriction [FreeTextEntry2] : Geeta [Normal] : Recto-Vaginal Exam: Normal [de-identified] : The laparoscopic incision site shows no signs or symptoms of infection. [de-identified] : vaginal cuff healing well  a small debri noted at the cuff that will probably slough off eventually  no mass palpated not tender no guarding

## 2025-05-13 NOTE — HISTORY OF PRESENT ILLNESS
[FreeTextEntry1] : The patient is presenting today for a pre-chemotherapy evaluation for Cycle 2 of Carboplatin and Paclitaxel, scheduled for 05/15/2025.  Patient feels well today. She forgot to take her steroid medication the night before her treatment.  she will remember to do this this time Patient reported no fever, chills, nausea, vomiting, or vaginal bleeding. There were not any changes in bowel or bladder habits. Regular BM and voiding freely. She has lost her hair and opted to shave the rest of it. 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  Advise her to wear a mask at all time. She said she still has energy to work.  On the other had she felt she had no choice given that it is  family business and it has not been easy to find good hire.  -CBC/CMP/Mg/Phos/ today again reminded her to take her dexamethasone on 5/14 for her treatment on 5/15.  === VISIT HISTORY 04/03/2025 Patient reported feeling well today and no complaint of fever, chills, nausea, vomiting, diarrhea, or vaginal bleeding. She took her narcotic medication the first two days after surgery.  She has not taken any pain medication since then.  GI function and  function both are back to normal  She did not remove her outside bandages.  03/11/2025 Ms. Giang, 61 years old, , LMP 02/2024, referred for abnormal endometrial biopsy.  Patient was seen by Dr. Arana for post-menopausal bleeding.  MRI-Pelvis (02/21/2025-[facility]-Dr. Rodríguez): Structure in the endometrial cavity measuring up to 1.2 cm with differential including polyp. EMB (02/27/2025-Dr. Patrick) showing papillary serious carcinoma. Microcalcification is present within the stroma. Endometrial polyps.  She reports bloating and lower back pain and vaginal bleeding/spotting.    === PATHOLOGY D&C/Hysteroscopy/EMB  (02/27/2025-Dr. Arana) Papillary serious carcinoma. Microcalcification is present within the stroma. Endometrial polyps   SURGICAL PATHOLOGY Final Diagnosis (03/24/2025- Yumiko) 1. Uterus, cervix, bilateral ovaries and fallopian tubes; hysterectomy and salpingo-oophorectomy: - Minute residual serous carcinoma. - No myometrial invasion seen. - No lymphovascular invasion seen. - Benign fallopian tubes, ovaries and cervix. - See synoptic summary. 2. Lymph node, left external illiac sentinel; excision: - One lymph node, negative for carcinoma on H T E slide (0/1). 3. Lymph node, right external illiac sentinel; excision: - Two lymph nodes, negative for carcinoma on H T E slide (0/2). Note: [place] lymph node protocol is ordered in parts 2 and 3, and will be reported in an addendum. 4. Omentum; excision: - Benign fibroadipose tissue.  === IMAGINE  CAT scan/urogram(11/16/2024-[facility]) Impression: No urolithiasis, solid renal lesion or collecting system filling defect to explain hematuria. No acute process in the abdomen.  MRI-Pelvis (02/21/2025-[facility]) FINDINGS: -UTERUS: The uterus is retroflexed. The uterus measures 3.5 x 4,3 5.4 cm in AP, transverse, and craniocaudal dimension. There is a 2.0 cm fibroid anteriorly from the right fundus with subserosal component and mild enhancement with v contrast. -ENDOMETRIUM: The endometrial stripe measures 0.5 cm. There is a polypoid structure within the endometrial cavity measuring up to 1.2 x 0,5 cm. -JUNCTIONAL ZONE: The junctional zone is focally thickened posteriorly. The anterior junctional zone measures 24 cm. The posterior junctional zone measures focally thickened and measuring up to 1,2 cm cm. IMPRESSION: -Structure in the endometrial cavity measuring up to 1.2 cm with differential including polyp. -Focal thickening of the posterior junctional zone compatible with adenomyosis -Right fundal intramural and subserosal fibroid with enhancement -No bladder mass or abnormal enhancement. The etiology the patient's hematuria is not identified on this exam.    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 03/24/2025 SURGERY- Robot assisted total laparoscopic hysterectomy, bilateral salpingoophorectomy, cystoscopy, and bilateral sentinel lymph node mapping and biopsy cystoscopy STageIAG3 2009 and Stage IC in 2023  TUMOR BOARD (04/09/2025- Yumiko)   Diagnosis: Stage IC nonmyoinvasive uterine serous carcinoma  (2009 [place])  Recommendation: VBT + systemic therapy NCCN Guidelines discussed: yes  CHEMOTHERAPY 04/24/2025 C1D1 Taxol 175mg/m2 Carboplatin AUC 5 05/15/2025 C2D1 Taxol 175mg/m2 Carboplatin AUC 5 06/05/2025 C3D1  06/26/2025 C4D1  07/17/2025 C5D1  08/07/2025 C6D1   VAGINAL BRACHYTHERAPY after systemic therapy  HEALTH MAINTENANCE Mammogram (01/2025) Negative  Colonoscopy: Will have fu visit ( it was done in 01/2025)   Endoscopy: Negative (01/2025) PAP: Unsure?

## 2025-06-03 NOTE — HISTORY OF PRESENT ILLNESS
[FreeTextEntry1] : The patient presents today for pre-chemotherapy evaluation prior to Cycle 3 of Carboplatin and Paclitaxel, scheduled for 06/05/2025.  She will have vaginal brachytherapy following completion of systemic therapy.  Patient felt well today. Denied fever, chills, nausea, vomiting, or vaginal bleeding. Report regular BM and voiding freely. Denied changes in appetite or unintentional weight loss or gain.  Patient reported peripheral neuropathy in both hands and feet, notably worsening approximately one week after chemotherapy. She also reported finger joint pain and swelling. 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  Patient reported a large mortgage that family has to pay.  She has no choice but keep working   === VISIT HISTORY 05/13/2025 She has lost her hair and opted to shave the rest of it. 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  Advise her to wear a mask at all time. She said she still has energy to work.    04/03/2025 Patient reported feeling well today and no complaint of fever, chills, nausea, vomiting, diarrhea, or vaginal bleeding. She took her narcotic medication the first two days after surgery.  She has not taken any pain medication since then.  GI function and  function both are back to normal  She did not remove her outside bandages.  03/11/2025 Ms. Giang, 61 years old, , LMP 02/2024, referred for abnormal endometrial biopsy.  Patient was seen by Dr. Arana for post-menopausal bleeding.  MRI-Pelvis (02/21/2025-[facility]-Dr. Rodríguez): Structure in the endometrial cavity measuring up to 1.2 cm with differential including polyp. EMB (02/27/2025-Dr. Patrick) showing papillary serious carcinoma. Microcalcification is present within the stroma. Endometrial polyps.  She reports bloating and lower back pain and vaginal bleeding/spotting.    === PATHOLOGY D&C/Hysteroscopy/EMB  (02/27/2025-Dr. Araan) Papillary serious carcinoma. Microcalcification is present within the stroma. Endometrial polyps   SURGICAL PATHOLOGY Final Diagnosis (03/24/2025- Yumiko) 1. Uterus, cervix, bilateral ovaries and fallopian tubes; hysterectomy and salpingo-oophorectomy: - Minute residual serous carcinoma. - No myometrial invasion seen. - No lymphovascular invasion seen. - Benign fallopian tubes, ovaries and cervix. - See synoptic summary. 2. Lymph node, left external illiac sentinel; excision: - One lymph node, negative for carcinoma on H T E slide (0/1). 3. Lymph node, right external illiac sentinel; excision: - Two lymph nodes, negative for carcinoma on H T E slide (0/2). Note: [place] lymph node protocol is ordered in parts 2 and 3, and will be reported in an addendum. 4. Omentum; excision: - Benign fibroadipose tissue.  === IMAGINE  CAT scan/urogram(11/16/2024-[facility]) Impression: No urolithiasis, solid renal lesion or collecting system filling defect to explain hematuria. No acute process in the abdomen.  MRI-Pelvis (02/21/2025-[facility]) FINDINGS: -UTERUS: The uterus is retroflexed. The uterus measures 3.5 x 4,3 5.4 cm in AP, transverse, and craniocaudal dimension. There is a 2.0 cm fibroid anteriorly from the right fundus with subserosal component and mild enhancement with v contrast. -ENDOMETRIUM: The endometrial stripe measures 0.5 cm. There is a polypoid structure within the endometrial cavity measuring up to 1.2 x 0,5 cm. -JUNCTIONAL ZONE: The junctional zone is focally thickened posteriorly. The anterior junctional zone measures 24 cm. The posterior junctional zone measures focally thickened and measuring up to 1,2 cm cm. IMPRESSION: -Structure in the endometrial cavity measuring up to 1.2 cm with differential including polyp. -Focal thickening of the posterior junctional zone compatible with adenomyosis -Right fundal intramural and subserosal fibroid with enhancement -No bladder mass or abnormal enhancement. The etiology the patient's hematuria is not identified on this exam.    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 03/24/2025 SURGERY- Robot assisted total laparoscopic hysterectomy, bilateral salpingoophorectomy, cystoscopy, and bilateral sentinel lymph node mapping and biopsy cystoscopy STageIAG3 2009 and Stage IC in 2023  TUMOR BOARD (04/09/2025- [facility])   Diagnosis: Stage IC nonmyoinvasive uterine serous carcinoma  (2009 [place])  Recommendation: VBT + systemic therapy NCCN Guidelines discussed: yes  CHEMOTHERAPY 04/24/2025 C1D1 Taxol 175mg/m2 Carboplatin AUC 5 05/15/2025 C2D1 Taxol 175mg/m2 Carboplatin AUC 5 06/05/2025 C3D1 Taxol 135mg/m2 Carboplatin AUC 5 06/26/2025 C4D1  07/17/2025 C5D1  08/07/2025 C6D1   VAGINAL BRACHYTHERAPY after systemic therapy  HEALTH MAINTENANCE Mammogram (01/2025) Negative  Colonoscopy: Will have fu visit ( it was done in 01/2025)   Endoscopy: Negative (01/2025) PAP: Unsure?

## 2025-06-03 NOTE — HISTORY OF PRESENT ILLNESS
[FreeTextEntry1] : The patient presents today for pre-chemotherapy evaluation prior to Cycle 3 of Carboplatin and Paclitaxel, scheduled for 06/05/2025.  She will have vaginal brachytherapy following completion of systemic therapy.  Patient felt well today. Denied fever, chills, nausea, vomiting, or vaginal bleeding. Report regular BM and voiding freely. Denied changes in appetite or unintentional weight loss or gain.  Patient reported peripheral neuropathy in both hands and feet, notably worsening approximately one week after chemotherapy. She also reported finger joint pain and swelling. 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  Patient reported a large mortgage that family has to pay.  She has no choice but keep working   === VISIT HISTORY 05/13/2025 She has lost her hair and opted to shave the rest of it. 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  Advise her to wear a mask at all time. She said she still has energy to work.    04/03/2025 Patient reported feeling well today and no complaint of fever, chills, nausea, vomiting, diarrhea, or vaginal bleeding. She took her narcotic medication the first two days after surgery.  She has not taken any pain medication since then.  GI function and  function both are back to normal  She did not remove her outside bandages.  03/11/2025 Ms. Giang, 61 years old, , LMP 02/2024, referred for abnormal endometrial biopsy.  Patient was seen by Dr. Arana for post-menopausal bleeding.  MRI-Pelvis (02/21/2025-[facility]-Dr. Rodríguez): Structure in the endometrial cavity measuring up to 1.2 cm with differential including polyp. EMB (02/27/2025-Dr. Patrick) showing papillary serious carcinoma. Microcalcification is present within the stroma. Endometrial polyps.  She reports bloating and lower back pain and vaginal bleeding/spotting.    === PATHOLOGY D&C/Hysteroscopy/EMB  (02/27/2025-Dr. Arana) Papillary serious carcinoma. Microcalcification is present within the stroma. Endometrial polyps   SURGICAL PATHOLOGY Final Diagnosis (03/24/2025- Yumiko) 1. Uterus, cervix, bilateral ovaries and fallopian tubes; hysterectomy and salpingo-oophorectomy: - Minute residual serous carcinoma. - No myometrial invasion seen. - No lymphovascular invasion seen. - Benign fallopian tubes, ovaries and cervix. - See synoptic summary. 2. Lymph node, left external illiac sentinel; excision: - One lymph node, negative for carcinoma on H T E slide (0/1). 3. Lymph node, right external illiac sentinel; excision: - Two lymph nodes, negative for carcinoma on H T E slide (0/2). Note: [place] lymph node protocol is ordered in parts 2 and 3, and will be reported in an addendum. 4. Omentum; excision: - Benign fibroadipose tissue.  === IMAGINE  CAT scan/urogram(11/16/2024-[facility]) Impression: No urolithiasis, solid renal lesion or collecting system filling defect to explain hematuria. No acute process in the abdomen.  MRI-Pelvis (02/21/2025-[facility]) FINDINGS: -UTERUS: The uterus is retroflexed. The uterus measures 3.5 x 4,3 5.4 cm in AP, transverse, and craniocaudal dimension. There is a 2.0 cm fibroid anteriorly from the right fundus with subserosal component and mild enhancement with v contrast. -ENDOMETRIUM: The endometrial stripe measures 0.5 cm. There is a polypoid structure within the endometrial cavity measuring up to 1.2 x 0,5 cm. -JUNCTIONAL ZONE: The junctional zone is focally thickened posteriorly. The anterior junctional zone measures 24 cm. The posterior junctional zone measures focally thickened and measuring up to 1,2 cm cm. IMPRESSION: -Structure in the endometrial cavity measuring up to 1.2 cm with differential including polyp. -Focal thickening of the posterior junctional zone compatible with adenomyosis -Right fundal intramural and subserosal fibroid with enhancement -No bladder mass or abnormal enhancement. The etiology the patient's hematuria is not identified on this exam.    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 03/24/2025 SURGERY- Robot assisted total laparoscopic hysterectomy, bilateral salpingoophorectomy, cystoscopy, and bilateral sentinel lymph node mapping and biopsy cystoscopy STageIAG3 2009 and Stage IC in 2023  TUMOR BOARD (04/09/2025- [facility])   Diagnosis: Stage IC nonmyoinvasive uterine serous carcinoma  (2009 [place])  Recommendation: VBT + systemic therapy NCCN Guidelines discussed: yes  CHEMOTHERAPY 04/24/2025 C1D1 Taxol 175mg/m2 Carboplatin AUC 5 05/15/2025 C2D1 Taxol 175mg/m2 Carboplatin AUC 5 06/05/2025 C3D1 Taxol 135mg/m2 Carboplatin AUC 5 06/26/2025 C4D1  07/17/2025 C5D1  08/07/2025 C6D1   VAGINAL BRACHYTHERAPY after systemic therapy  HEALTH MAINTENANCE Mammogram (01/2025) Negative  Colonoscopy: Will have fu visit ( it was done in 01/2025)   Endoscopy: Negative (01/2025) PAP: Unsure?

## 2025-06-03 NOTE — ASSESSMENT
[FreeTextEntry1] : The patient presents today for a pre-chemotherapy evaluation in preparation for Cycle 3 of Carboplatin and Paclitaxel, scheduled for 06/05/2025.   The patient is clinically stable. Pre-chemotherapy blood work has been sent. Labs will be reviewed prior to approval of Cycle 3 chemotherapy this Thursday. Due to the development of peripheral neuropathy and her work schedule which she kept during her treatment, we plan to reduce the dose of Taxol to 135 mg/m for Cycle 3-06/05/2025.  The patient was counseled on the importance of maintaining a healthy lifestyle. 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 Provided burn precautions due to the risk of peripheral neuropathy associated with treatment. She also reported finger joint pain and swelling. Advise to take Motrin to help the discomfort.  An initial vaginal brachytherapy (VBT) consultation with Dr. Mejia (Radiation Oncology) at [facility] will be scheduled after Cycle 3. An email was sent today to initiate the referral.  The patient will return for an office visit in approximately 3 weeks, or sooner if symptoms arise. She has been instructed to call the office between visits with any concerns. All questions were addressed during today's visit.

## 2025-06-03 NOTE — PHYSICAL EXAM
[Chaperoned Physical Exam] : A chaperone was present in the examining room during all aspects of the physical examination. [MA] : MA [Absent] : Adnexa(ae): Absent [Normal] : Recto-Vaginal Exam: Normal [Fully active, able to carry on all pre-disease performance without restriction] : Status 0 - Fully active, able to carry on all pre-disease performance without restriction [de-identified] : Surgical removal [FreeTextEntry2] : Geeta [de-identified] : The laparoscopic incision site shows no signs or symptoms of infection. [de-identified] : vaginal cuff healed  no mass no nodularity noted on exam

## 2025-06-03 NOTE — PAST MEDICAL HISTORY
[Postmenopausal] : The patient is postmenopausal [Menarche Age ____] : age at menarche was [unfilled] [Menopause Age____] : age at menopause was [unfilled] [Total Preg ___] : G[unfilled] [Live Births ___] : P[unfilled]  [Living ___] : Living: [unfilled] [AB Induced ___] : elective abortions: [unfilled]  [de-identified] : 49 yo

## 2025-06-03 NOTE — REVIEW OF SYSTEMS
[Negative] : Musculoskeletal [Neuropathy] : neuropathy [FreeTextEntry1] : Hair loss [FreeTextEntry2] : Patient reported peripheral neuropathy in both hands and feet, notably worsening approximately one week after chemotherapy.  [de-identified] :  She also reported finger joint pain and swelling.

## 2025-06-03 NOTE — LETTER BODY
[Attached please find my note.] : Attached please find my note. [FreeTextEntry2] : Miroslava Arana MD [address] [place] [address] Tel [phone] Fax [phone]   Dear Dr Arana   Ms Ezekiel Giang was seen in my office before her chemotherapy treatment.  Please see my consult note for her plan of care.  Thank you for allowing me to participate in the care of this patient.    Please do not hesitate to call if you have any questions.    Most Sincerely,      Gilbert Aguillon MD [facility] Director, [facility] Professor of Obstetrics and Gynecology, [employer] [facility] Division of Gynecologic Oncology Department Obstetrics and Gynecology Tel [phone] or [phone] Fax [phone] [email or web address]

## 2025-06-03 NOTE — ASSESSMENT
[FreeTextEntry1] : The patient presents today for a pre-chemotherapy evaluation in preparation for Cycle 3 of Carboplatin and Paclitaxel, scheduled for 06/05/2025.   The patient is clinically stable. Pre-chemotherapy blood work has been sent. Labs will be reviewed prior to approval of Cycle 3 chemotherapy this Thursday. Due to the development of peripheral neuropathy and her work schedule which she kept during her treatment, we plan to reduce the dose of Taxol to 135 mg/m for Cycle 3-06/05/2025.  The patient was counseled on the importance of maintaining a healthy lifestyle. 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 Provided burn precautions due to the risk of peripheral neuropathy associated with treatment. She also reported finger joint pain and swelling. Advise to take Motrin to help the discomfort.  An initial vaginal brachytherapy (VBT) consultation with Dr. Mejia (Radiation Oncology) at [facility] will be scheduled after Cycle 3. An email was sent today to initiate the referral.  The patient will return for an office visit in approximately 3 weeks, or sooner if symptoms arise. She has been instructed to call the office between visits with any concerns. All questions were addressed during today's visit.

## 2025-06-03 NOTE — PHYSICAL EXAM
[Chaperoned Physical Exam] : A chaperone was present in the examining room during all aspects of the physical examination. [MA] : MA [Absent] : Adnexa(ae): Absent [Normal] : Recto-Vaginal Exam: Normal [Fully active, able to carry on all pre-disease performance without restriction] : Status 0 - Fully active, able to carry on all pre-disease performance without restriction [de-identified] : Surgical removal [FreeTextEntry2] : Geeta [de-identified] : The laparoscopic incision site shows no signs or symptoms of infection. [de-identified] : vaginal cuff healed  no mass no nodularity noted on exam

## 2025-06-03 NOTE — PAST MEDICAL HISTORY
[Postmenopausal] : The patient is postmenopausal [Menarche Age ____] : age at menarche was [unfilled] [Menopause Age____] : age at menopause was [unfilled] [Total Preg ___] : G[unfilled] [Live Births ___] : P[unfilled]  [Living ___] : Living: [unfilled] [AB Induced ___] : elective abortions: [unfilled]  [de-identified] : 49 yo

## 2025-06-03 NOTE — REVIEW OF SYSTEMS
[Negative] : Musculoskeletal [Neuropathy] : neuropathy [FreeTextEntry1] : Hair loss [FreeTextEntry2] : Patient reported peripheral neuropathy in both hands and feet, notably worsening approximately one week after chemotherapy.  [de-identified] :  She also reported finger joint pain and swelling.

## 2025-06-03 NOTE — LETTER BODY
[Attached please find my note.] : Attached please find my note. [FreeTextEntry2] : Miroslava Arana MD [address] [place] [address] Tel [phone] Fax [phone]   Dear Dr Arana   Ms Ezekiel Giang was seen in my office before her chemotherapy treatment.  Please see my consult note for her plan of care.  Thank you for allowing me to participate in the care of this patient.    Please do not hesitate to call if you have any questions.    Most Sincerely,      Gilbert Aguillon MD [facility] Director, [facility] Professor of Obstetrics and Gynecology, [facility] [employer] at [facility] Division of Gynecologic Oncology Department Obstetrics and Gynecology Tel [phone] or [phone] Fax [phone] [email or web address]

## 2025-06-24 NOTE — HISTORY OF PRESENT ILLNESS
[FreeTextEntry1] : The patient presents today for pre-chemotherapy evaluation prior to Cycle 4 of Carboplatin and Paclitaxel, scheduled for 06/26/2025.  She will have vaginal brachytherapy following completion of systemic therapy.  Patient felt well today. Denied fever, chills, nausea, vomiting, or vaginal bleeding. Regular BM and voiding freely. Denied changes in appetite or unintentional weight loss or gain. Patient reported improvement in peripheral neuropathy symptoms following dose reduction after cycle 3 of her current treatment regimen. 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 She also reported finger joint pain and swelling. 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  Patient reported a large mortgage that family has to pay.  She has no choice but keep working. Cycle 3 Taxol 135mg. dosage reduced on 06/05.  05/13/2025 She has lost her hair and opted to shave the rest of it. 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  Advise her to wear a mask at all time. She said she still has energy to work.    04/03/2025 Patient reported feeling well today and no complaint of fever, chills, nausea, vomiting, diarrhea, or vaginal bleeding. She took her narcotic medication the first two days after surgery.  She has not taken any pain medication since then.  GI function and  function both are back to normal  She did not remove her outside bandages.  03/11/2025 Ms. Giang, 61 years old, , LMP 02/2024, referred for abnormal endometrial biopsy.  Patient was seen by Dr. Arana for post-menopausal bleeding.  MRI-Pelvis (02/21/2025-[facility]-Dr. Rodríguez): Structure in the endometrial cavity measuring up to 1.2 cm with differential including polyp. EMB (02/27/2025-Dr. Patrick) showing papillary serious carcinoma. Microcalcification is present within the stroma. Endometrial polyps.  She reports bloating and lower back pain and vaginal bleeding/spotting.    === PATHOLOGY D&C/Hysteroscopy/EMB  (02/27/2025-Dr. Arana) Papillary serious carcinoma. Microcalcification is present within the stroma. Endometrial polyps   SURGICAL PATHOLOGY Final Diagnosis (03/24/2025- Yumiko) 1. Uterus, cervix, bilateral ovaries and fallopian tubes; hysterectomy and salpingo-oophorectomy: - Minute residual serous carcinoma. - No myometrial invasion seen. - No lymphovascular invasion seen. - Benign fallopian tubes, ovaries and cervix. - See synoptic summary. 2. Lymph node, left external illiac sentinel; excision: - One lymph node, negative for carcinoma on H T E slide (0/1). 3. Lymph node, right external illiac sentinel; excision: - Two lymph nodes, negative for carcinoma on H T E slide (0/2). Note: [place] lymph node protocol is ordered in parts 2 and 3, and will be reported in an addendum. 4. Omentum; excision: - Benign fibroadipose tissue.  === IMAGINE  CAT scan/urogram(11/16/2024-[facility]) Impression: No urolithiasis, solid renal lesion or collecting system filling defect to explain hematuria. No acute process in the abdomen.  MRI-Pelvis (02/21/2025-[facility]) FINDINGS: -UTERUS: The uterus is retroflexed. The uterus measures 3.5 x 4,3 5.4 cm in AP, transverse, and craniocaudal dimension. There is a 2.0 cm fibroid anteriorly from the right fundus with subserosal component and mild enhancement with v contrast. -ENDOMETRIUM: The endometrial stripe measures 0.5 cm. There is a polypoid structure within the endometrial cavity measuring up to 1.2 x 0,5 cm. -JUNCTIONAL ZONE: The junctional zone is focally thickened posteriorly. The anterior junctional zone measures 24 cm. The posterior junctional zone measures focally thickened and measuring up to 1,2 cm cm. IMPRESSION: -Structure in the endometrial cavity measuring up to 1.2 cm with differential including polyp. -Focal thickening of the posterior junctional zone compatible with adenomyosis -Right fundal intramural and subserosal fibroid with enhancement -No bladder mass or abnormal enhancement. The etiology the patient's hematuria is not identified on this exam.    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 03/24/2025 SURGERY- Robot assisted total laparoscopic hysterectomy, bilateral salpingoophorectomy, cystoscopy, and bilateral sentinel lymph node mapping and biopsy cystoscopy STageIAG3 2009 and Stage IC in 2023  TUMOR BOARD (04/09/2025- Yumiko)   Diagnosis: Stage IC nonmyoinvasive uterine serous carcinoma  (2009 [place])  Recommendation: VBT + systemic therapy NCCN Guidelines discussed: yes  CHEMOTHERAPY 04/24/2025 C1D1 Taxol 175mg/m2 Carboplatin AUC 5 05/15/2025 C2D1 Taxol 175mg/m2 Carboplatin AUC 5 06/05/2025 C3D1 Taxol 135mg/m2 Carboplatin AUC 5 06/26/2025 C4D1 Taxol 135mg/m2 Carboplatin AUC 5 07/17/2025 C5D1  08/07/2025 C6D1   VAGINAL BRACHYTHERAPY after systemic therapy  HEALTH MAINTENANCE Mammogram (01/2025) Negative  Colonoscopy: Will have fu visit ( it was done in 01/2025)   Endoscopy: Negative (01/2025) PAP: Unsure?

## 2025-06-24 NOTE — PHYSICAL EXAM
[Chaperoned Physical Exam] : A chaperone was present in the examining room during all aspects of the physical examination. [MA] : MA [Normal] : Recto-Vaginal Exam: Normal [Fully active, able to carry on all pre-disease performance without restriction] : Status 0 - Fully active, able to carry on all pre-disease performance without restriction [Absent] : CVAT: absent [FreeTextEntry2] : Geeta [de-identified] : The laparoscopic incision site shows no signs or symptoms of infection. [de-identified] : vaginal cuff healed  no mass no nodularity noted on exam

## 2025-06-24 NOTE — HISTORY OF PRESENT ILLNESS
[FreeTextEntry1] : The patient presents today for pre-chemotherapy evaluation prior to Cycle 4 of Carboplatin and Paclitaxel, scheduled for 06/26/2025.  She will have vaginal brachytherapy following completion of systemic therapy.  Patient felt well today. Denied fever, chills, nausea, vomiting, or vaginal bleeding. Regular BM and voiding freely. Denied changes in appetite or unintentional weight loss or gain. Patient reported improvement in peripheral neuropathy symptoms following dose reduction after cycle 3 of her current treatment regimen. 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 She also reported finger joint pain and swelling. 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  Patient reported a large mortgage that family has to pay.  She has no choice but keep working. Cycle 3 Taxol 135mg. dosage reduced on 06/05.  05/13/2025 She has lost her hair and opted to shave the rest of it. 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  Advise her to wear a mask at all time. She said she still has energy to work.    04/03/2025 Patient reported feeling well today and no complaint of fever, chills, nausea, vomiting, diarrhea, or vaginal bleeding. She took her narcotic medication the first two days after surgery.  She has not taken any pain medication since then.  GI function and  function both are back to normal  She did not remove her outside bandages.  03/11/2025 Ms. Giang, 61 years old, , LMP 02/2024, referred for abnormal endometrial biopsy.  Patient was seen by Dr. Arana for post-menopausal bleeding.  MRI-Pelvis (02/21/2025-[facility]-Dr. Rodríguez): Structure in the endometrial cavity measuring up to 1.2 cm with differential including polyp. EMB (02/27/2025-Dr. Patrick) showing papillary serious carcinoma. Microcalcification is present within the stroma. Endometrial polyps.  She reports bloating and lower back pain and vaginal bleeding/spotting.    === PATHOLOGY D&C/Hysteroscopy/EMB  (02/27/2025-Dr. Arana) Papillary serious carcinoma. Microcalcification is present within the stroma. Endometrial polyps   SURGICAL PATHOLOGY Final Diagnosis (03/24/2025- Yumiko) 1. Uterus, cervix, bilateral ovaries and fallopian tubes; hysterectomy and salpingo-oophorectomy: - Minute residual serous carcinoma. - No myometrial invasion seen. - No lymphovascular invasion seen. - Benign fallopian tubes, ovaries and cervix. - See synoptic summary. 2. Lymph node, left external illiac sentinel; excision: - One lymph node, negative for carcinoma on H T E slide (0/1). 3. Lymph node, right external illiac sentinel; excision: - Two lymph nodes, negative for carcinoma on H T E slide (0/2). Note: [place] lymph node protocol is ordered in parts 2 and 3, and will be reported in an addendum. 4. Omentum; excision: - Benign fibroadipose tissue.  === IMAGINE  CAT scan/urogram(11/16/2024-[facility]) Impression: No urolithiasis, solid renal lesion or collecting system filling defect to explain hematuria. No acute process in the abdomen.  MRI-Pelvis (02/21/2025-[facility]) FINDINGS: -UTERUS: The uterus is retroflexed. The uterus measures 3.5 x 4,3 5.4 cm in AP, transverse, and craniocaudal dimension. There is a 2.0 cm fibroid anteriorly from the right fundus with subserosal component and mild enhancement with v contrast. -ENDOMETRIUM: The endometrial stripe measures 0.5 cm. There is a polypoid structure within the endometrial cavity measuring up to 1.2 x 0,5 cm. -JUNCTIONAL ZONE: The junctional zone is focally thickened posteriorly. The anterior junctional zone measures 24 cm. The posterior junctional zone measures focally thickened and measuring up to 1,2 cm cm. IMPRESSION: -Structure in the endometrial cavity measuring up to 1.2 cm with differential including polyp. -Focal thickening of the posterior junctional zone compatible with adenomyosis -Right fundal intramural and subserosal fibroid with enhancement -No bladder mass or abnormal enhancement. The etiology the patient's hematuria is not identified on this exam.    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 03/24/2025 SURGERY- Robot assisted total laparoscopic hysterectomy, bilateral salpingoophorectomy, cystoscopy, and bilateral sentinel lymph node mapping and biopsy cystoscopy STageIAG3 2009 and Stage IC in 2023  TUMOR BOARD (04/09/2025- Yumiko)   Diagnosis: Stage IC nonmyoinvasive uterine serous carcinoma  (2009 [place])  Recommendation: VBT + systemic therapy NCCN Guidelines discussed: yes  CHEMOTHERAPY 04/24/2025 C1D1 Taxol 175mg/m2 Carboplatin AUC 5 05/15/2025 C2D1 Taxol 175mg/m2 Carboplatin AUC 5 06/05/2025 C3D1 Taxol 135mg/m2 Carboplatin AUC 5 06/26/2025 C4D1 Taxol 135mg/m2 Carboplatin AUC 5 07/17/2025 C5D1  08/07/2025 C6D1   VAGINAL BRACHYTHERAPY after systemic therapy  HEALTH MAINTENANCE Mammogram (01/2025) Negative  Colonoscopy: Will have fu visit ( it was done in 01/2025)   Endoscopy: Negative (01/2025) PAP: Unsure?

## 2025-06-24 NOTE — PHYSICAL EXAM
[Chaperoned Physical Exam] : A chaperone was present in the examining room during all aspects of the physical examination. [MA] : MA [Normal] : Recto-Vaginal Exam: Normal [Fully active, able to carry on all pre-disease performance without restriction] : Status 0 - Fully active, able to carry on all pre-disease performance without restriction [Absent] : CVAT: absent [FreeTextEntry2] : Geeta [de-identified] : The laparoscopic incision site shows no signs or symptoms of infection. [de-identified] : vaginal cuff healed  no mass no nodularity noted on exam

## 2025-06-24 NOTE — PAST MEDICAL HISTORY
[Postmenopausal] : The patient is postmenopausal [Menarche Age ____] : age at menarche was [unfilled] [Menopause Age____] : age at menopause was [unfilled] [Total Preg ___] : G[unfilled] [Live Births ___] : P[unfilled]  [Living ___] : Living: [unfilled] [AB Induced ___] : elective abortions: [unfilled]  [de-identified] : 51 yo

## 2025-06-24 NOTE — ASSESSMENT
[FreeTextEntry1] : The patient presents today for a pre-chemotherapy evaluation in preparation for Cycle 4 of Carboplatin and Paclitaxel, scheduled for 06/26/2025. She is currently clinically stable.  Pre-chemotherapy blood work has been obtained and will be reviewed prior to final approval for Cycle 4 administration on Thursday.  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 Burn precautions were reviewed due to the risk of peripheral neuropathy associated with her chemotherapy regimen.  The patient has been receiving a reduced dose of Paclitaxel at 135 mg since Cycle 3 due to prior symptoms of peripheral neuropathy. She reports improvement in neuropathic symptoms in both hands and feet since the dose adjustment.  Patient reports improvement in peripheral neuropathy symptoms following dose reduction after cycle 3 of her current treatment regimen. She also reported experiencing right flank and hip discomfort for the past three days, which she believes is due to overexertion at the restaurant. She noted that the symptoms are gradually improving. Advised her to wear proper, comfortable footwear while working to help prevent further strain.  An initial consultation for vaginal brachytherapy with Dr. Mejia at [facility] is scheduled for 07/10/2025.  The patient will return for an office visit in approximately three weeks, or sooner if symptoms arise.  She has been instructed to contact the office between visits with any concerns. All questions were addressed during today's visit.

## 2025-06-24 NOTE — PAST MEDICAL HISTORY
[Postmenopausal] : The patient is postmenopausal [Menarche Age ____] : age at menarche was [unfilled] [Menopause Age____] : age at menopause was [unfilled] [Total Preg ___] : G[unfilled] [Live Births ___] : P[unfilled]  [Living ___] : Living: [unfilled] [AB Induced ___] : elective abortions: [unfilled]  [de-identified] : 49 yo

## 2025-06-24 NOTE — REVIEW OF SYSTEMS
[Negative] : Gastrointestinal [Neuropathy] : neuropathy [FreeTextEntry1] : Hair loss [FreeTextEntry2] : reported improvement in peripheral neuropathy symptoms following dose reduction after cycle 3 of her current treatment regimen. 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

## 2025-06-24 NOTE — REVIEW OF SYSTEMS
[Negative] : Gastrointestinal [Neuropathy] : neuropathy [FreeTextEntry1] : Hair loss [FreeTextEntry2] : reported improvement in peripheral neuropathy symptoms following dose reduction after cycle 3 of her current treatment regimen. 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

## 2025-07-14 NOTE — PHYSICAL EXAM
[Normal] : well developed, well nourished, in no acute distress [] : no respiratory distress [Respiration, Rhythm And Depth] : normal respiratory rhythm and effort [Edema] : no peripheral edema present [Abdomen Soft] : soft [Abdomen Tenderness] : non-tender [Oriented To Time, Place, And Person] : oriented to person, place, and time [Affect] : the affect was normal [Normal External Genitalia] : normal external genitalia  [Normal Vaginal Cuff] : vaginal cuff without lesion or nodularity [de-identified] : slightly narrowed vaginal canal expect 2.5 or 3 cm diameter cylinder

## 2025-07-14 NOTE — PHYSICAL EXAM
[Normal] : well developed, well nourished, in no acute distress [] : no respiratory distress [Respiration, Rhythm And Depth] : normal respiratory rhythm and effort [Edema] : no peripheral edema present [Abdomen Soft] : soft [Abdomen Tenderness] : non-tender [Oriented To Time, Place, And Person] : oriented to person, place, and time [Affect] : the affect was normal [Normal External Genitalia] : normal external genitalia  [Normal Vaginal Cuff] : vaginal cuff without lesion or nodularity [de-identified] : slightly narrowed vaginal canal expect 2.5 or 3 cm diameter cylinder

## 2025-07-14 NOTE — REASON FOR VISIT
[Family Member] : family member [Language Line ] : provided by Language Line   [Interpreters_IDNumber] : [id number] [Interpreters_FullName] : Felipe [FreeTextEntry3] : Mandarin [TWNoteComboBox1] : Chinese

## 2025-07-14 NOTE — HISTORY OF PRESENT ILLNESS
[FreeTextEntry1] : This is a 61-year-old woman with recently diagnosed uterine serous carcinoma, s/pcomprehensive surgical staging  currently receiving chemotherapy, presenting for discussion of radiation therapy.  Her HPI as I understand it is summarized below:  She presented with PMB. MRI 2/25 showed a structure in the endometrial cavity measuring up to 1.2 cm. EMB on 2/27/25 showed papillary serous carcinoma. CT C/A/P on showed RUL groundglass opacity which could be infectious or inflammatory. No lymphadenopathy, no suspicious osseous lesions.  She underwent RA TLH, BSO, cystoscopy, b/l sentinel lymph node mapping and biopsy with Dr Aguillon on 3/24/25. Pathology: high grade serous carcinoma, no myometrial invasion, no LVI, all regional lymph nodes negative.  Started treatment with Carbo/Taxol, s/p C4 on 6/26/25. Dose was reduced due to peripheral neuropathy.  She presents today for radiation medicine consultation. Continues on chemotherapy, should complete C6 around 8/7/25.  She is generally feeling well, some fatigue, and tolerating chemotherapy with no n/v, no diarrhea or constipation.  Peripheral neuropathy right hand. She has no abdominal or pelvic pain, no vaginal bleeding, no urinary complaints.

## 2025-07-14 NOTE — REASON FOR VISIT
[Family Member] : family member [Language Line ] : provided by Language Line   [Interpreters_IDNumber] : [id number] [Interpreters_FullName] : Felipe [FreeTextEntry3] : Mandarin [TWNoteComboBox1] : Chinese

## 2025-07-14 NOTE — PHYSICAL EXAM
[Normal] : well developed, well nourished, in no acute distress [] : no respiratory distress [Respiration, Rhythm And Depth] : normal respiratory rhythm and effort [Edema] : no peripheral edema present [Abdomen Soft] : soft [Abdomen Tenderness] : non-tender [Oriented To Time, Place, And Person] : oriented to person, place, and time [Affect] : the affect was normal [Normal External Genitalia] : normal external genitalia  [Normal Vaginal Cuff] : vaginal cuff without lesion or nodularity [de-identified] : slightly narrowed vaginal canal expect 2.5 or 3 cm diameter cylinder

## 2025-07-14 NOTE — REASON FOR VISIT
[Family Member] : family member [Language Line ] : provided by Language Line   [Interpreters_IDNumber] : [id number] [Interpreters_FullName] : Felipe [FreeTextEntry3] : Mandarin [TWNoteComboBox1] : Chinese

## 2025-07-14 NOTE — REASON FOR VISIT
[Family Member] : family member [Language Line ] : provided by Language Line   [Interpreters_IDNumber] : [id number] [Interpreters_FullName] : Felipe [FreeTextEntry3] : Mandarin [TWNoteComboBox1] : Chinese

## 2025-07-14 NOTE — PHYSICAL EXAM
[Normal] : well developed, well nourished, in no acute distress [] : no respiratory distress [Respiration, Rhythm And Depth] : normal respiratory rhythm and effort [Edema] : no peripheral edema present [Abdomen Soft] : soft [Abdomen Tenderness] : non-tender [Oriented To Time, Place, And Person] : oriented to person, place, and time [Affect] : the affect was normal [Normal External Genitalia] : normal external genitalia  [Normal Vaginal Cuff] : vaginal cuff without lesion or nodularity [de-identified] : slightly narrowed vaginal canal expect 2.5 or 3 cm diameter cylinder

## 2025-07-15 NOTE — HISTORY OF PRESENT ILLNESS
[FreeTextEntry1] : The patient presents today for a pre-chemotherapy evaluation in preparation for Cycle 5 of Carboplatin and Paclitaxel, scheduled for 07/17/2025. She is planned to undergo vaginal brachytherapy (VBT) following the completion of systemic chemotherapy.  She had a VBT consultation with Dr. Mejia on 07/10/2025. The plan includes vaginal cylinder placement, scheduling of CT imaging, and initiation of brachytherapy approximately two weeks after the final chemotherapy. Next appt 0/08/2025.  The patient reports feeling well today. She denies fever, chills, nausea, vomiting, or vaginal bleeding. She reports regular bowel movements and normal voiding. There are no changes in appetite and no unintentional weight loss or gain. 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 The discomfort subsides with rest. Both hands and palms appear slightly yellowish. Patient reports daily consumption of one glass of carrot juice.  === VISIT HISTORY 06/24/2025 Patient reported improvement in peripheral neuropathy symptoms following dose reduction after cycle 3 of her current treatment regimen. 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  06/03/2025 Patient reported peripheral neuropathy in both hands and feet, notably worsening approximately one week after chemotherapy. She also reported finger joint pain and swelling. 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  Patient reported a large mortgage that family has to pay.  She has no choice but keep working. Cycle 3 Taxol 135mg. dosage reduced on 06/05.  05/13/2025 She has lost her hair and opted to shave the rest of it. 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  Advise her to wear a mask at all time. She said she still has energy to work.    04/03/2025 Patient reported feeling well today and no complaint of fever, chills, nausea, vomiting, diarrhea, or vaginal bleeding. She took her narcotic medication the first two days after surgery.  She has not taken any pain medication since then.  GI function and  function both are back to normal  She did not remove her outside bandages.  03/11/2025 Ms. Giang, 61 years old, , LMP 02/2024, referred for abnormal endometrial biopsy.  Patient was seen by Dr. Arana for post-menopausal bleeding.  MRI-Pelvis (02/21/2025-[facility]-Dr. Rodríguez): Structure in the endometrial cavity measuring up to 1.2 cm with differential including polyp. EMB (02/27/2025-Dr. Patrick) showing papillary serious carcinoma. Microcalcification is present within the stroma. Endometrial polyps.  She reports bloating and lower back pain and vaginal bleeding/spotting.    === PATHOLOGY D&C/Hysteroscopy/EMB  (02/27/2025-Dr. Arana) Papillary serious carcinoma. Microcalcification is present within the stroma. Endometrial polyps   SURGICAL PATHOLOGY Final Diagnosis (03/24/2025- Yumiko) 1. Uterus, cervix, bilateral ovaries and fallopian tubes; hysterectomy and salpingo-oophorectomy: - Minute residual serous carcinoma. - No myometrial invasion seen. - No lymphovascular invasion seen. - Benign fallopian tubes, ovaries and cervix. - See synoptic summary. 2. Lymph node, left external illiac sentinel; excision: - One lymph node, negative for carcinoma on H T E slide (0/1). 3. Lymph node, right external illiac sentinel; excision: - Two lymph nodes, negative for carcinoma on H T E slide (0/2). Note: [place] lymph node protocol is ordered in parts 2 and 3, and will be reported in an addendum. 4. Omentum; excision: - Benign fibroadipose tissue.  === IMAGINE  CAT scan/urogram(11/16/2024-[facility]) Impression: No urolithiasis, solid renal lesion or collecting system filling defect to explain hematuria. No acute process in the abdomen.  MRI-Pelvis (02/21/2025-[facility]) FINDINGS: -UTERUS: The uterus is retroflexed. The uterus measures 3.5 x 4,3 5.4 cm in AP, transverse, and craniocaudal dimension. There is a 2.0 cm fibroid anteriorly from the right fundus with subserosal component and mild enhancement with v contrast. -ENDOMETRIUM: The endometrial stripe measures 0.5 cm. There is a polypoid structure within the endometrial cavity measuring up to 1.2 x 0,5 cm. -JUNCTIONAL ZONE: The junctional zone is focally thickened posteriorly. The anterior junctional zone measures 24 cm. The posterior junctional zone measures focally thickened and measuring up to 1,2 cm cm. IMPRESSION: -Structure in the endometrial cavity measuring up to 1.2 cm with differential including polyp. -Focal thickening of the posterior junctional zone compatible with adenomyosis -Right fundal intramural and subserosal fibroid with enhancement -No bladder mass or abnormal enhancement. The etiology the patient's hematuria is not identified on this exam.    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 03/24/2025 SURGERY- Robot assisted total laparoscopic hysterectomy, bilateral salpingoophorectomy, cystoscopy, and bilateral sentinel lymph node mapping and biopsy cystoscopy [id number] 2009 and Stage IC in 2023  TUMOR BOARD (04/09/2025- [facility])   Diagnosis: Stage IC nonmyoinvasive uterine serous carcinoma  (2009 [place])  Recommendation: VBT + systemic therapy NCCN Guidelines discussed: yes  CHEMOTHERAPY 04/24/2025 C1D1 Taxol 175mg/m2 Carboplatin AUC 5 05/15/2025 C2D1 Taxol 175mg/m2 Carboplatin AUC 5 06/05/2025 C3D1 Taxol 135mg/m2 Carboplatin AUC 5 06/26/2025 C4D1 Taxol 135mg/m2 Carboplatin AUC 5 07/17/2025 C5D1 Taxol 135mg/m2 Carboplatin AUC 5 08/07/2025 C6D1   VAGINAL BRACHYTHERAPY after systemic therapy  HEALTH MAINTENANCE Mammogram (01/2025) Negative  Colonoscopy: Will have fu visit ( it was done in 01/2025)   Endoscopy: Negative (01/2025)

## 2025-07-15 NOTE — PAST MEDICAL HISTORY
[Postmenopausal] : The patient is postmenopausal [Menarche Age ____] : age at menarche was [unfilled] [Menopause Age____] : age at menopause was [unfilled] [Total Preg ___] : G[unfilled] [Live Births ___] : P[unfilled]  [Living ___] : Living: [unfilled] [AB Induced ___] : elective abortions: [unfilled]  [de-identified] : 49 yo

## 2025-07-15 NOTE — HISTORY OF PRESENT ILLNESS
[FreeTextEntry1] : The patient presents today for a pre-chemotherapy evaluation in preparation for Cycle 5 of Carboplatin and Paclitaxel, scheduled for 07/17/2025. She is planned to undergo vaginal brachytherapy (VBT) following the completion of systemic chemotherapy.  She had a VBT consultation with Dr. Mejia on 07/10/2025. The plan includes vaginal cylinder placement, scheduling of CT imaging, and initiation of brachytherapy approximately two weeks after the final chemotherapy. Next appt 0/08/2025.  The patient reports feeling well today. She denies fever, chills, nausea, vomiting, or vaginal bleeding. She reports regular bowel movements and normal voiding. There are no changes in appetite and no unintentional weight loss or gain. 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 The discomfort subsides with rest. Both hands and palms appear slightly yellowish. Patient reports daily consumption of one glass of carrot juice.  === VISIT HISTORY 06/24/2025 Patient reported improvement in peripheral neuropathy symptoms following dose reduction after cycle 3 of her current treatment regimen. 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  06/03/2025 Patient reported peripheral neuropathy in both hands and feet, notably worsening approximately one week after chemotherapy. She also reported finger joint pain and swelling. 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  Patient reported a large mortgage that family has to pay.  She has no choice but keep working. Cycle 3 Taxol 135mg. dosage reduced on 06/05.  05/13/2025 She has lost her hair and opted to shave the rest of it. 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  Advise her to wear a mask at all time. She said she still has energy to work.    04/03/2025 Patient reported feeling well today and no complaint of fever, chills, nausea, vomiting, diarrhea, or vaginal bleeding. She took her narcotic medication the first two days after surgery.  She has not taken any pain medication since then.  GI function and  function both are back to normal  She did not remove her outside bandages.  03/11/2025 Ms. Giang, 61 years old, , LMP 02/2024, referred for abnormal endometrial biopsy.  Patient was seen by Dr. Arana for post-menopausal bleeding.  MRI-Pelvis (02/21/2025-[facility]-Dr. Rodríguez): Structure in the endometrial cavity measuring up to 1.2 cm with differential including polyp. EMB (02/27/2025-Dr. Patrick) showing papillary serious carcinoma. Microcalcification is present within the stroma. Endometrial polyps.  She reports bloating and lower back pain and vaginal bleeding/spotting.    === PATHOLOGY D&C/Hysteroscopy/EMB  (02/27/2025-Dr. Arana) Papillary serious carcinoma. Microcalcification is present within the stroma. Endometrial polyps   SURGICAL PATHOLOGY Final Diagnosis (03/24/2025- Y[facility]) 1. Uterus, cervix, bilateral ovaries and fallopian tubes; hysterectomy and salpingo-oophorectomy: - Minute residual serous carcinoma. - No myometrial invasion seen. - No lymphovascular invasion seen. - Benign fallopian tubes, ovaries and cervix. - See synoptic summary. 2. Lymph node, left external illiac sentinel; excision: - One lymph node, negative for carcinoma on H T E slide (0/1). 3. Lymph node, right external illiac sentinel; excision: - Two lymph nodes, negative for carcinoma on H T E slide (0/2). Note: [place] lymph node protocol is ordered in parts 2 and 3, and will be reported in an addendum. 4. Omentum; excision: - Benign fibroadipose tissue.  === IMAGINE  CAT scan/urogram(11/16/2024-[facility]) Impression: No urolithiasis, solid renal lesion or collecting system filling defect to explain hematuria. No acute process in the abdomen.  MRI-Pelvis (02/21/2025-[facility]) FINDINGS: -UTERUS: The uterus is retroflexed. The uterus measures 3.5 x 4,3 5.4 cm in AP, transverse, and craniocaudal dimension. There is a 2.0 cm fibroid anteriorly from the right fundus with subserosal component and mild enhancement with v contrast. -ENDOMETRIUM: The endometrial stripe measures 0.5 cm. There is a polypoid structure within the endometrial cavity measuring up to 1.2 x 0,5 cm. -JUNCTIONAL ZONE: The junctional zone is focally thickened posteriorly. The anterior junctional zone measures 24 cm. The posterior junctional zone measures focally thickened and measuring up to 1,2 cm cm. IMPRESSION: -Structure in the endometrial cavity measuring up to 1.2 cm with differential including polyp. -Focal thickening of the posterior junctional zone compatible with adenomyosis -Right fundal intramural and subserosal fibroid with enhancement -No bladder mass or abnormal enhancement. The etiology the patient's hematuria is not identified on this exam.    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

## 2025-07-15 NOTE — REVIEW OF SYSTEMS
[Negative] : Gastrointestinal [Neuropathy] : neuropathy [FreeTextEntry1] : Both hands and palms appear slightly yellowish. Patient reports daily consumption of one glass of carrot juice. [FreeTextEntry2] : reported improvement in peripheral neuropathy symptoms following dose reduction after cycle 3 of her current treatment regimen. 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

## 2025-07-15 NOTE — PAST MEDICAL HISTORY
[Postmenopausal] : The patient is postmenopausal [Menarche Age ____] : age at menarche was [unfilled] [Menopause Age____] : age at menopause was [unfilled] [Total Preg ___] : G[unfilled] [Live Births ___] : P[unfilled]  [Living ___] : Living: [unfilled] [AB Induced ___] : elective abortions: [unfilled]  [de-identified] : 49 yo

## 2025-07-15 NOTE — ASSESSMENT
[FreeTextEntry1] : The patient presents today for a pre-chemotherapy evaluation in preparation for Cycle 5 of Paclitaxel 135 mg/m and Carboplatin AUC 5, scheduled for 07/17/2025.  She remains clinically stable. The dose of Paclitaxel has been reduced to 135 mg/m since Cycle 3 due to prior symptoms of peripheral neuropathy. Pre-chemotherapy labs have been obtained and will be reviewed prior to final clearance for Cycle 5 administration on Thursday.  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 The patient noted  improvement in her peripheral neuropathy symptoms.  Both hands and palms appear slightly yellowish. Patient reports daily consumption of one glass of carrot juice.  Last Ast/ALT on 06/25 is wnl. 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  She had a vaginal brachytherapy (VBT) consultation with Dr. Mejia on 07/10/2025. The plan includes vaginal cylinder placement, CT imaging, and initiation of brachytherapy approximately two weeks after completion of chemotherapy. Her next appointment is scheduled for 08/08/2025.  A CT scan of the chest, abdomen, and pelvis (C/A/P) will be scheduled following completion of Cycle 6.  The patient will return for an office visit in approximately three weeks, or sooner if symptoms arise. She has been instructed to contact the office between visits with any concerns.  All questions were addressed during today's visit.

## 2025-07-15 NOTE — LETTER BODY
[Attached please find my note.] : Attached please find my note. [FreeTextEntry2] : Miroslava Arana MD [address] [place] [address] Tel [phone] Fax [phone]    Dear Dr Arana   Ms Ezekiel Giang was seen in my office for a prechemotherapy evaluation.  Please see my consult note for her plan of care.  Thank you for allowing me to participate in the care of this patient.    Please do not hesitate to call if you have any questions.    Most Sincerely,       Gilbert Aguillon MD [facility] Director, [facility] Professor of Obstetrics and Gynecology, [facility] [employer] at \[facility]\"" Division of Gynecologic Oncology Department Obstetrics and Gynecology Tel [phone] or [phone] Fax [phone] [email or web address]

## 2025-07-15 NOTE — LETTER BODY
[Attached please find my note.] : Attached please find my note. [FreeTextEntry2] : Miroslava Arana MD [address] [place] [address] Tel [phone] Fax [phone]    Dear Dr Arana   Ms Ezekiel Giang was seen in my office for a prechemotherapy evaluation.  Please see my consult note for her plan of care.  Thank you for allowing me to participate in the care of this patient.    Please do not hesitate to call if you have any questions.    Most Sincerely,       Gilbert Aguillon MD [facility] Director, [facility] Professor of Obstetrics and Gynecology, [facility] [employer] at [facility] Division of Gynecologic Oncology Department Obstetrics and Gynecology Tel [phone] or [phone] Fax [phone] [email or web address]

## 2025-07-15 NOTE — REVIEW OF SYSTEMS
[Negative] : Gastrointestinal [Neuropathy] : neuropathy [FreeTextEntry1] : Both hands and palms appear slightly yellowish. Patient reports daily consumption of one glass of carrot juice. [FreeTextEntry2] : reported improvement in peripheral neuropathy symptoms following dose reduction after cycle 3 of her current treatment regimen. 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

## 2025-07-15 NOTE — PHYSICAL EXAM
[Chaperoned Physical Exam] : A chaperone was present in the examining room during all aspects of the physical examination. [MA] : MA [Absent] : Adnexa(ae): Absent [Normal] : Recto-Vaginal Exam: Normal [Fully active, able to carry on all pre-disease performance without restriction] : Status 0 - Fully active, able to carry on all pre-disease performance without restriction [de-identified] : The laparoscopic incision site shows no signs or symptoms of infection. [FreeTextEntry2] : Geeta [de-identified] : vaginal cuff healed  no mass no nodularity noted on exam

## 2025-07-15 NOTE — PHYSICAL EXAM
[Chaperoned Physical Exam] : A chaperone was present in the examining room during all aspects of the physical examination. [MA] : MA [Absent] : Adnexa(ae): Absent [Normal] : Recto-Vaginal Exam: Normal [Fully active, able to carry on all pre-disease performance without restriction] : Status 0 - Fully active, able to carry on all pre-disease performance without restriction [de-identified] : The laparoscopic incision site shows no signs or symptoms of infection. [FreeTextEntry2] : Geeta [de-identified] : vaginal cuff healed  no mass no nodularity noted on exam